# Patient Record
Sex: FEMALE | Race: BLACK OR AFRICAN AMERICAN | Employment: OTHER | URBAN - METROPOLITAN AREA
[De-identification: names, ages, dates, MRNs, and addresses within clinical notes are randomized per-mention and may not be internally consistent; named-entity substitution may affect disease eponyms.]

---

## 2017-02-08 ENCOUNTER — OFFICE VISIT (OUTPATIENT)
Dept: INTERNAL MEDICINE CLINIC | Age: 62
End: 2017-02-08

## 2017-02-08 VITALS
TEMPERATURE: 98.2 F | WEIGHT: 211 LBS | RESPIRATION RATE: 16 BRPM | BODY MASS INDEX: 37.39 KG/M2 | SYSTOLIC BLOOD PRESSURE: 107 MMHG | DIASTOLIC BLOOD PRESSURE: 77 MMHG | HEIGHT: 63 IN | OXYGEN SATURATION: 95 % | HEART RATE: 82 BPM

## 2017-02-08 DIAGNOSIS — E86.0 DEHYDRATION: ICD-10-CM

## 2017-02-08 DIAGNOSIS — R42 DIZZINESS: Primary | ICD-10-CM

## 2017-02-08 DIAGNOSIS — I95.1 ORTHOSTATIC HYPOTENSION: ICD-10-CM

## 2017-02-08 RX ORDER — MECLIZINE HYDROCHLORIDE 25 MG/1
25 TABLET ORAL
Qty: 12 TAB | Refills: 0 | Status: SHIPPED | OUTPATIENT
Start: 2017-02-08 | End: 2017-02-13 | Stop reason: SDUPTHER

## 2017-02-08 NOTE — MR AVS SNAPSHOT
Visit Information Date & Time Provider Department Dept. Phone Encounter #  
 2/8/2017 10:15 AM Reena Perez Ramandeep Rey [phone] [id number] Follow-up Instructions Return if symptoms worsen or fail to improve. Your Appointments 4/25/2017 10:30 AM  
ROUTINE CARE with Sayda Roland MD  
Ramandeep Rey [facility]) Appt Note: htn gerd, gluc [address] [address] [phone]  
  
   
 [address] Upcoming Health Maintenance Date Due Pneumococcal 19-64 Medium Risk (1 of 1 - PPSV23) 10/24/1976 INFLUENZA AGE 9 TO ADULT 8/1/2016 PAP AKA CERVICAL CYTOLOGY 10/30/2016 BREAST CANCER SCRN MAMMOGRAM 11/22/2016 COLONOSCOPY 11/25/2024 DTaP/Tdap/Td series (2 - Td) 1/23/2025 Allergies as of 2/8/2017  Review Complete On: 2/8/2017 By: Reena Perez MD  
  
 Severity Noted Reaction Type Reactions Sulfa (Sulfonamide Antibiotics)  09/09/2010    Other (comments) Bactrim - rash Current Immunizations  Reviewed on 10/17/2016 Name Date Influenza Vaccine 11/7/2014 TD Vaccine 4/22/1999 Tdap 1/23/2015 Not reviewed this visit You Were Diagnosed With   
  
 Codes Comments Dizziness    -  Primary ICD-10-CM: K76 ICD-9-CM: 780.4 Dehydration     ICD-10-CM: E86.0 ICD-9-CM: 276.51 Orthostatic hypotension     ICD-10-CM: I95.1 ICD-9-CM: 458.0 Vitals BP Pulse Temp Resp Height(growth percentile) Weight(growth percentile) 107/77 (BP Patient Position: Standing) 82 98.2 °F (36.8 °C) (Oral) 16 5' 3\" (1.6 m) 211 lb (95.7 kg) SpO2 BMI OB Status Smoking Status 95% 37.38 kg/m2 Postmenopausal Former Smoker Vitals History BMI and BSA Data Body Mass Index Body Surface Area  
 37.38 kg/m 2 2.06 m 2 Preferred Pharmacy Pharmacy Name Phone [facility] [address], [address] [phone] Your Updated Medication List  
  
   
This list is accurate as of: 2/8/17 11:31 AM.  Always use your most recent med list.  
  
  
  
  
 calcium carbonate 200 mg calcium (500 mg) Nai Ramos Commonly known as:  TUMS Take 1 Tab by mouth daily. lisinopril-hydroCHLOROthiazide 10-12.5 mg per tablet Commonly known as:  PRINZIDE, ZESTORETIC  
take 1 tablet by mouth once daily  
  
 meclizine 25 mg tablet Commonly known as:  ANTIVERT Take 1 Tab by mouth three (3) times daily as needed for up to 10 days. For dizziness. raNITIdine 300 mg tablet Commonly known as:  ZANTAC Take 1 Tab by mouth daily. Prescriptions Printed Refills  
 meclizine (ANTIVERT) 25 mg tablet 0 Sig: Take 1 Tab by mouth three (3) times daily as needed for up to 10 days. For dizziness. Class: Print Route: Oral  
  
Follow-up Instructions Return if symptoms worsen or fail to improve. Patient Instructions Dizziness: Care Instructions Your Care Instructions Dizziness is the feeling of unsteadiness or fuzziness in your head. It is different than having vertigo, which is a feeling that the room is spinning or that you are moving or falling. It is also different from lightheadedness, which is the feeling that you are about to faint. It can be hard to know what causes dizziness. Some people feel dizzy when they have migraine headaches. Sometimes bouts of flu can make you feel dizzy. Some medical conditions, such as heart problems or high blood pressure, can make you feel dizzy. Many medicines can cause dizziness, including medicines for high blood pressure, pain, or anxiety. If a medicine causes your symptoms, your doctor may recommend that you stop or change the medicine. If it is a problem with your heart, you may need medicine to help your heart work better.  If there is no clear reason for your symptoms, your doctor may suggest watching and waiting for a while to see if the dizziness goes away on its own. Follow-up care is a key part of your treatment and safety. Be sure to make and go to all appointments, and call your doctor if you are having problems. It's also a good idea to know your test results and keep a list of the medicines you take. How can you care for yourself at home? · If your doctor recommends or prescribes medicine, take it exactly as directed. Call your doctor if you think you are having a problem with your medicine. · Do not drive while you feel dizzy. · Try to prevent falls. Steps you can take include: ¨ Using nonskid mats, adding grab bars near the tub, and using night-lights. ¨ Clearing your home so that walkways are free of anything you might trip on. ¨ Letting family and friends know that you have been feeling dizzy. This will help them know how to help you. When should you call for help? Call 911 anytime you think you may need emergency care. For example, call if: 
· You passed out (lost consciousness). · You have dizziness along with symptoms of a heart attack. These may include: ¨ Chest pain or pressure, or a strange feeling in the chest. 
¨ Sweating. ¨ Shortness of breath. ¨ Nausea or vomiting. ¨ Pain, pressure, or a strange feeling in the back, neck, jaw, or upper belly or in one or both shoulders or arms. ¨ Lightheadedness or sudden weakness. ¨ A fast or irregular heartbeat. · You have symptoms of a stroke. These may include: 
¨ Sudden numbness, tingling, weakness, or loss of movement in your face, arm, or leg, especially on only one side of your body. ¨ Sudden vision changes. ¨ Sudden trouble speaking. ¨ Sudden confusion or trouble understanding simple statements. ¨ Sudden problems with walking or balance. ¨ A sudden, severe headache that is different from past headaches. Call your doctor now or seek immediate medical care if: · You feel dizzy and have a fever, headache, or ringing in your ears. · You have new or increased nausea and vomiting. · Your dizziness does not go away or comes back. Watch closely for changes in your health, and be sure to contact your doctor if: 
· You do not get better as expected. Where can you learn more? Go to http://gomez-denise.info/. Enter J359 in the search box to learn more about \"Dizziness: Care Instructions. \" Current as of: May 27, 2016 Content Version: 11.1 © 3750-3897 [employer]. Care instructions adapted under license by [employer] (which disclaims liability or warranty for this information). If you have questions about a medical condition or this instruction, always ask your healthcare professional. [address] any warranty or liability for your use of this information. Introducing [facility]! Larisa Johnson introduces Syncurity patient portal. Now you can access parts of your medical record, email your doctor's office, and request medication refills online. 1. In your internet browser, go to [email or web address] 2. Click on the First Time User? Click Here link in the Sign In box. You will see the New Member Sign Up page. 3. Enter your Syncurity Access Code exactly as it appears below. You will not need to use this code after youve completed the sign-up process. If you do not sign up before the expiration date, you must request a new code. · Syncurity Access Code: [email or web address] Expires: 5/9/2017 11:31 AM 
 
4. Enter the last four digits of your Social Security Number (xxxx) and Date of Birth (mm/dd/yyyy) as indicated and click Submit. You will be taken to the next sign-up page. 5. Create a HaveMyShiftt ID. This will be your Syncurity login ID and cannot be changed, so think of one that is secure and easy to remember. 6. Create a Syncurity password. You can change your password at any time. 7. Enter your Password Reset Question and Answer. This can be used at a later time if you forget your password. 8. Enter your e-mail address. You will receive e-mail notification when new information is available in [address] 9. Click Sign Up. You can now view and download portions of your medical record. 10. Click the Download Summary menu link to download a portable copy of your medical information. If you have questions, please visit the Frequently Asked Questions section of the Experts 911 website. Remember, Experts 911 is NOT to be used for urgent needs. For medical emergencies, dial 911. Now available from your iPhone and Android! Please provide this summary of care documentation to your next provider. Your primary care clinician is listed as Billy Tobar. If you have any questions after today's visit, please call [phone].

## 2017-02-08 NOTE — PROGRESS NOTES
Reviewed record  In preparation for visit and have obtained necessary documentation. 1. Have you been to the ER, urgent care clinic since your last visit? Hospitalized since your last visit?no  2. Have you seen or consulted any other health care providers outside of the [address] [place] [address] since your last visit? Include any pap smears or colon screening. No  Patient has been given information on advanced directives at a previous visit.    Orthostatics:  Lyin/76 P 74  Sittin/72 P72  Standin/77 P82

## 2017-02-08 NOTE — PROGRESS NOTES
CC:  Chief Complaint   Patient presents with    Dizziness    Nausea     HISTORY OF PRESENT ILLNESS  Makenna Malone is a 63 y.o. female. She complains of experiencing sudden dizziness and nausea this morning while working as a  for disabled adults. Felt like she was going to throw up. She drove back to the main office and was told to leave work and see a doctor. Her sister picked her up and drove her home. Severe dizziness lasted about 30 minutes. She presently still has some lightheadedness but is better than before. Admits that she had not been drinking much water. Drank tea this morning with breakfast. Denies fevers, chills, loss of consciousness, falls, visual changes, ear pains, chest pain, heart palpations, or dyspnea. Denies previous episodes of dizziness. Review of medications: took loratadine 10 mg tablet this morning for allergies; does not normally take it. Took both loratadine and lisinopril-HCTZ at 5:20 am before going to work. Patient Active Problem List   Diagnosis Code    Abnormal Pap smear [id number]    Asthma, mild intermittent J45.20    Hypertension, essential, benign I10    Obesity E66.9    GERD (gastroesophageal reflux disease) K21.9    Hyperlipidemia, mixed E78.2    Glucose intolerance (impaired glucose tolerance) R73.02    Smoker within last 12 months Z87.891     Past Medical History   Diagnosis Date    Alopecia areata     Asthmatic bronchitis     Cyst, breast      breast cyst on L     Hypercholesterolemia     Hypertension      Allergies   Allergen Reactions    Sulfa (Sulfonamide Antibiotics) Other (comments)     Bactrim - rash     Current Outpatient Prescriptions   Medication Sig Dispense Refill    ranitidine (ZANTAC) 300 mg tablet Take 1 Tab by mouth daily.  30 Tab 11    lisinopril-hydrochlorothiazide (PRINZIDE, ZESTORETIC) 10-12.5 mg per tablet take 1 tablet by mouth once daily 90 Tab 3    calcium carbonate (TUMS) 200 mg calcium (500 mg) chew Take 1 Tab by mouth daily. PHYSICAL EXAM  Visit Vitals    /77 (BP Patient Position: Standing)    Pulse 82    Temp 98.2 °F (36.8 °C) (Oral)    Resp 16    Ht 5' 3\" (1.6 m)    Wt 211 lb (95.7 kg)    SpO2 95%    BMI 37.38 kg/m2     Orthostatic Vitals: Lyin/76 P 74, Sittin/72 P 72, Standin/77 P 82    General: Obese, no distress. HEENT:  Head normocephalic/atraumatic, no scleral icterus or conjunctival injection. TM's and ear canals normal bilaterally. Neck: Supple. No lymphadenopathy, thyromegaly, or carotid bruits. Lungs:  Clear to ausculation bilaterally. Good air movement. Heart:  Regular rate and rhythm, normal S1 and S2, no murmur, gallop, or rub  Extremities: No clubbing, cyanosis, or edema. Neurological: Alert and oriented. No nystagmus. Normal strength throughout. Positive Ernesto-Hallpike maneuver. Psychiatric: Normal mood and affect. Behavior is normal.           ASSESSMENT AND PLAN    ICD-10-CM ICD-9-CM    1. Dizziness R42 780.4 meclizine (ANTIVERT) 25 mg tablet   2. Dehydration E86.0 276.51    3. Orthostatic hypotension I95.1 458.0        [facility] KARLY was seen today for dizziness and nausea. Diagnoses and all orders for this visit:    Dizziness  Most likely secondary to orthostatic hypotension from dehydration and combination of antihistamine with blood pressure medication. It is also possible that she has newly presenting vertigo. She does not feel safe to drive at this time. -     Advised to increase fluid intake  -     Given prescription to fill only if dizziness recurred: meclizine (ANTIVERT) 25 mg tablet; Take 1 Tab by mouth three (3) times daily as needed for up to 10 days. For dizziness. -     Work excuse note written with return to work on Fri., 2/10/17. Dehydration    Orthostatic hypotension      Follow-up Disposition:  Return if symptoms worsen or fail to improve.     Provided patient and/or family with advanced directive information and answered pertinent questions. Encouraged patient to provide a copy of advanced directive to the office when available. I have discussed the diagnosis with the patient and the intended plan as seen in the above orders. Patient is in agreement. The patient has received an after-visit summary and questions were answered concerning future plans. I have discussed medication side effects and warnings with the patient as well.

## 2017-02-08 NOTE — PATIENT INSTRUCTIONS
Dizziness: Care Instructions  Your Care Instructions  Dizziness is the feeling of unsteadiness or fuzziness in your head. It is different than having vertigo, which is a feeling that the room is spinning or that you are moving or falling. It is also different from lightheadedness, which is the feeling that you are about to faint. It can be hard to know what causes dizziness. Some people feel dizzy when they have migraine headaches. Sometimes bouts of flu can make you feel dizzy. Some medical conditions, such as heart problems or high blood pressure, can make you feel dizzy. Many medicines can cause dizziness, including medicines for high blood pressure, pain, or anxiety. If a medicine causes your symptoms, your doctor may recommend that you stop or change the medicine. If it is a problem with your heart, you may need medicine to help your heart work better. If there is no clear reason for your symptoms, your doctor may suggest watching and waiting for a while to see if the dizziness goes away on its own. Follow-up care is a key part of your treatment and safety. Be sure to make and go to all appointments, and call your doctor if you are having problems. It's also a good idea to know your test results and keep a list of the medicines you take. How can you care for yourself at home? · If your doctor recommends or prescribes medicine, take it exactly as directed. Call your doctor if you think you are having a problem with your medicine. · Do not drive while you feel dizzy. · Try to prevent falls. Steps you can take include:  ¨ Using nonskid mats, adding grab bars near the tub, and using night-lights. ¨ Clearing your home so that walkways are free of anything you might trip on. ¨ Letting family and friends know that you have been feeling dizzy. This will help them know how to help you. When should you call for help? Call 911 anytime you think you may need emergency care.  For example, call if:  · You passed out (lost consciousness). · You have dizziness along with symptoms of a heart attack. These may include:  ¨ Chest pain or pressure, or a strange feeling in the chest.  ¨ Sweating. ¨ Shortness of breath. ¨ Nausea or vomiting. ¨ Pain, pressure, or a strange feeling in the back, neck, jaw, or upper belly or in one or both shoulders or arms. ¨ Lightheadedness or sudden weakness. ¨ A fast or irregular heartbeat. · You have symptoms of a stroke. These may include:  ¨ Sudden numbness, tingling, weakness, or loss of movement in your face, arm, or leg, especially on only one side of your body. ¨ Sudden vision changes. ¨ Sudden trouble speaking. ¨ Sudden confusion or trouble understanding simple statements. ¨ Sudden problems with walking or balance. ¨ A sudden, severe headache that is different from past headaches. Call your doctor now or seek immediate medical care if:  · You feel dizzy and have a fever, headache, or ringing in your ears. · You have new or increased nausea and vomiting. · Your dizziness does not go away or comes back. Watch closely for changes in your health, and be sure to contact your doctor if:  · You do not get better as expected. Where can you learn more? Go to http://gomez-denise.info/. Enter W255 in the search box to learn more about \"Dizziness: Care Instructions. \"  Current as of: May 27, 2016  Content Version: 11.1  © 3330-5867 [employer]. Care instructions adapted under license by [employer] (which disclaims liability or warranty for this information). If you have questions about a medical condition or this instruction, always ask your healthcare professional. [place] [address] any warranty or liability for your use of this information.

## 2017-02-13 DIAGNOSIS — R42 DIZZINESS: ICD-10-CM

## 2017-02-13 RX ORDER — MECLIZINE HYDROCHLORIDE 25 MG/1
25 TABLET ORAL
Qty: 12 TAB | Refills: 0 | Status: SHIPPED | OUTPATIENT
Start: 2017-02-13 | End: 2017-02-23

## 2017-02-13 NOTE — TELEPHONE ENCOUNTER
Pt saw Dr Sana Pastor last week and was given the following medication to help with dizziness. Pt would like to get a few more to help because she is still having some spells.

## 2017-03-09 ENCOUNTER — HOSPITAL ENCOUNTER (OUTPATIENT)
Dept: CT IMAGING | Age: 62
Discharge: HOME | End: 2017-03-09
Attending: SPECIALIST
Payer: COMMERCIAL

## 2017-03-09 DIAGNOSIS — D32.9 MENINGIOMA (HCC): ICD-10-CM

## 2017-03-09 PROCEDURE — 70450 CT HEAD/BRAIN W/O DYE: CPT

## 2017-05-26 RX ORDER — LISINOPRIL AND HYDROCHLOROTHIAZIDE 10; 12.5 MG/1; MG/1
TABLET ORAL
Qty: 90 TAB | Refills: 3 | Status: SHIPPED | OUTPATIENT
Start: 2017-05-26 | End: 2018-06-08 | Stop reason: SDUPTHER

## 2017-05-26 NOTE — TELEPHONE ENCOUNTER
Pt went to pharmacy yesterday to request her Lisinopril and they advised would send over request because she has no refills, went back today and they advised her haven't heard anything back  Pt would like to know if able to have Rx called in because she is completely out and needs her medication ASAP  Would like to receive call at [phone] once it has been called in

## 2017-06-30 ENCOUNTER — OFFICE VISIT (OUTPATIENT)
Dept: INTERNAL MEDICINE CLINIC | Age: 62
End: 2017-06-30

## 2017-06-30 VITALS
WEIGHT: 215.5 LBS | RESPIRATION RATE: 14 BRPM | TEMPERATURE: 97 F | OXYGEN SATURATION: 96 % | SYSTOLIC BLOOD PRESSURE: 127 MMHG | DIASTOLIC BLOOD PRESSURE: 75 MMHG | HEART RATE: 90 BPM | HEIGHT: 63 IN | BODY MASS INDEX: 38.18 KG/M2

## 2017-06-30 DIAGNOSIS — Z13.31 SCREENING FOR DEPRESSION: ICD-10-CM

## 2017-06-30 DIAGNOSIS — R30.0 BURNING WITH URINATION: ICD-10-CM

## 2017-06-30 DIAGNOSIS — N30.90 CYSTITIS: Primary | ICD-10-CM

## 2017-06-30 LAB
BILIRUB UR QL STRIP: NEGATIVE
GLUCOSE UR-MCNC: NEGATIVE MG/DL
KETONES P FAST UR STRIP-MCNC: NEGATIVE MG/DL
PH UR STRIP: 6 [PH] (ref 4.6–8)
PROT UR QL STRIP: NEGATIVE MG/DL
SP GR UR STRIP: 1.01 (ref 1–1.03)
UA UROBILINOGEN AMB POC: NORMAL (ref 0.2–1)
URINALYSIS CLARITY POC: CLEAR
URINALYSIS COLOR POC: YELLOW
URINE BLOOD POC: NORMAL
URINE LEUKOCYTES POC: NORMAL
URINE NITRITES POC: NEGATIVE

## 2017-06-30 RX ORDER — NITROFURANTOIN 25; 75 MG/1; MG/1
100 CAPSULE ORAL 2 TIMES DAILY
Qty: 10 CAP | Refills: 0 | Status: SHIPPED | OUTPATIENT
Start: 2017-06-30 | End: 2017-08-15 | Stop reason: ALTCHOICE

## 2017-06-30 NOTE — PATIENT INSTRUCTIONS
Urinary Tract Infection in Women: Care Instructions  Your Care Instructions    A urinary tract infection, or UTI, is a general term for an infection anywhere between the kidneys and the urethra (where urine comes out). Most UTIs are bladder infections. They often cause pain or burning when you urinate. UTIs are caused by bacteria and can be cured with antibiotics. Be sure to complete your treatment so that the infection goes away. Follow-up care is a key part of your treatment and safety. Be sure to make and go to all appointments, and call your doctor if you are having problems. It's also a good idea to know your test results and keep a list of the medicines you take. How can you care for yourself at home? · Take your antibiotics as directed. Do not stop taking them just because you feel better. You need to take the full course of antibiotics. · Drink extra water and other fluids for the next day or two. This may help wash out the bacteria that are causing the infection. (If you have kidney, heart, or liver disease and have to limit fluids, talk with your doctor before you increase your fluid intake.)  · Avoid drinks that are carbonated or have caffeine. They can irritate the bladder. · Urinate often. Try to empty your bladder each time. · To relieve pain, take a hot bath or lay a heating pad set on low over your lower belly or genital area. Never go to sleep with a heating pad in place. To prevent UTIs  · Drink plenty of water each day. This helps you urinate often, which clears bacteria from your system. (If you have kidney, heart, or liver disease and have to limit fluids, talk with your doctor before you increase your fluid intake.)  · Urinate when you need to. · Urinate right after you have sex. · Change sanitary pads often. · Avoid douches, bubble baths, feminine hygiene sprays, and other feminine hygiene products that have deodorants.   · After going to the bathroom, wipe from front to back.  When should you call for help? Call your doctor now or seek immediate medical care if:  · Symptoms such as fever, chills, nausea, or vomiting get worse or appear for the first time. · You have new pain in your back just below your rib cage. This is called flank pain. · There is new blood or pus in your urine. · You have any problems with your antibiotic medicine. Watch closely for changes in your health, and be sure to contact your doctor if:  · You are not getting better after taking an antibiotic for 2 days. · Your symptoms go away but then come back. Where can you learn more? Go to http://gomez-denise.info/. Enter G701 in the search box to learn more about \"Urinary Tract Infection in Women: Care Instructions. \"  Current as of: November 28, 2016  Content Version: 11.3  © 0390-0150 [employer]. Care instructions adapted under license by [employer] (which disclaims liability or warranty for this information). If you have questions about a medical condition or this instruction, always ask your healthcare professional. [address] any warranty or liability for your use of this information.

## 2017-06-30 NOTE — MR AVS SNAPSHOT
Visit Information Date & Time Provider Department Dept. Phone Encounter #  
 6/30/2017  1:00 PM MD Aida Nair [phone] [id number] Follow-up Instructions Return if symptoms worsen or fail to improve. Your Appointments 8/15/2017 10:30 AM  
ROUTINE CARE with MD Aida Nair [facility]) Appt Note: htn gerd, gluc; r/s f/up gluc [address] [place] [id number] [phone]  
  
   
 [address] Upcoming Health Maintenance Date Due Pneumococcal 19-64 Medium Risk (1 of 1 - PPSV23) 10/24/1976 PAP AKA CERVICAL CYTOLOGY 10/30/2016 BREAST CANCER SCRN MAMMOGRAM 11/22/2016 INFLUENZA AGE 9 TO ADULT 8/1/2017 COLONOSCOPY 11/25/2024 DTaP/Tdap/Td series (2 - Td) 1/23/2025 Allergies as of 6/30/2017  Review Complete On: 6/30/2017 By: Bronson Parisi MD  
  
 Severity Noted Reaction Type Reactions Penicillins  06/30/2017    Itching Sulfa (Sulfonamide Antibiotics)  09/09/2010    Other (comments) Bactrim - rash Current Immunizations  Reviewed on 6/30/2017 Name Date Influenza Vaccine 11/7/2014 TD Vaccine 4/22/1999 Tdap 1/23/2015 Reviewed by Yessi Pablo LPN on 0[birth year]72/1958 at 78:29 PM  
You Were Diagnosed With   
  
 Codes Comments Cystitis    -  Primary ICD-10-CM: N30.90 ICD-9-CM: 595.9 Burning with urination     ICD-10-CM: R30.0 ICD-9-CM: 788.1 Screening for depression     ICD-10-CM: Z13.89 ICD-9-CM: V79.0 Vitals BP Pulse Temp Resp Height(growth percentile) Weight(growth percentile) 127/75 (BP 1 Location: Left arm, BP Patient Position: Sitting) 90 97 °F (36.1 °C) (Oral) 14 5' 3\" (1.6 m) 215 lb 8 oz (97.8 kg) SpO2 BMI OB Status Smoking Status 96% 38.17 kg/m2 Postmenopausal Former Smoker BMI and BSA Data Body Mass Index Body Surface Area 38.17 kg/m 2 2.08 m 2 Preferred Pharmacy Pharmacy Name Phone [phone] , [address] [phone] Your Updated Medication List  
  
   
This list is accurate as of: 6/30/17  1:24 PM.  Always use your most recent med list.  
  
  
  
  
 calcium carbonate 200 mg calcium (500 mg) Meredith Warren Commonly known as:  TUMS Take 1 Tab by mouth daily. lisinopril-hydroCHLOROthiazide 10-12.5 mg per tablet Commonly known as:  PRINZIDE, ZESTORETIC  
take 1 tablet by mouth once daily  
  
 nitrofurantoin (macrocrystal-monohydrate) 100 mg capsule Commonly known as:  MACROBID Take 1 Cap by mouth two (2) times a day. raNITIdine 300 mg tablet Commonly known as:  ZANTAC Take 1 Tab by mouth daily. Prescriptions Sent to Pharmacy Refills  
 nitrofurantoin, macrocrystal-monohydrate, (MACROBID) 100 mg capsule 0 Sig: Take 1 Cap by mouth two (2) times a day. Class: Normal  
 Pharmacy: [address] Ph #: [phone] Route: Oral  
  
We Performed the Following AMB POC URINALYSIS DIP STICK AUTO W/O MICRO [56129 CPT(R)] BEHAV ASSMT W/SCORE & DOCD/STAND INSTRUMENT [id number] CPT(R)] CULTURE, URINE [id number] CPT(R)] Follow-up Instructions Return if symptoms worsen or fail to improve. Patient Instructions Urinary Tract Infection in Women: Care Instructions Your Care Instructions A urinary tract infection, or UTI, is a general term for an infection anywhere between the kidneys and the urethra (where urine comes out). Most UTIs are bladder infections. They often cause pain or burning when you urinate. UTIs are caused by bacteria and can be cured with antibiotics. Be sure to complete your treatment so that the infection goes away. Follow-up care is a key part of your treatment and safety.  Be sure to make and go to all appointments, and call your doctor if you are having problems. It's also a good idea to know your test results and keep a list of the medicines you take. How can you care for yourself at home? · Take your antibiotics as directed. Do not stop taking them just because you feel better. You need to take the full course of antibiotics. · Drink extra water and other fluids for the next day or two. This may help wash out the bacteria that are causing the infection. (If you have kidney, heart, or liver disease and have to limit fluids, talk with your doctor before you increase your fluid intake.) · Avoid drinks that are carbonated or have caffeine. They can irritate the bladder. · Urinate often. Try to empty your bladder each time. · To relieve pain, take a hot bath or lay a heating pad set on low over your lower belly or genital area. Never go to sleep with a heating pad in place. To prevent UTIs · Drink plenty of water each day. This helps you urinate often, which clears bacteria from your system. (If you have kidney, heart, or liver disease and have to limit fluids, talk with your doctor before you increase your fluid intake.) · Urinate when you need to. · Urinate right after you have sex. · Change sanitary pads often. · Avoid douches, bubble baths, feminine hygiene sprays, and other feminine hygiene products that have deodorants. · After going to the bathroom, wipe from front to back. When should you call for help? Call your doctor now or seek immediate medical care if: · Symptoms such as fever, chills, nausea, or vomiting get worse or appear for the first time. · You have new pain in your back just below your rib cage. This is called flank pain. · There is new blood or pus in your urine. · You have any problems with your antibiotic medicine. Watch closely for changes in your health, and be sure to contact your doctor if: · You are not getting better after taking an antibiotic for 2 days. · Your symptoms go away but then come back. Where can you learn more? Go to http://gomez-denise.info/. Enter U427 in the search box to learn more about \"Urinary Tract Infection in Women: Care Instructions. \" Current as of: November 28, 2016 Content Version: 11.3 © 5341-4290 [employer]. Care instructions adapted under license by [employer] (which disclaims liability or warranty for this information). If you have questions about a medical condition or this instruction, always ask your healthcare professional. [place] [address] any warranty or liability for your use of this information. Introducing [facility]! Tani Myers introduces Posto7 patient portal. Now you can access parts of your medical record, email your doctor's office, and request medication refills online. 1. In your internet browser, go to [email or web address] 2. Click on the First Time User? Click Here link in the Sign In box. You will see the New Member Sign Up page. 3. Enter your Posto7 Access Code exactly as it appears below. You will not need to use this code after youve completed the sign-up process. If you do not sign up before the expiration date, you must request a new code. · Posto7 Access Code: [email or web address] Expires: 9/28/2017  1:24 PM 
 
4. Enter the last four digits of your Social Security Number (xxxx) and Date of Birth (mm/dd/yyyy) as indicated and click Submit. You will be taken to the next sign-up page. 5. Create a Posto7 ID. This will be your Posto7 login ID and cannot be changed, so think of one that is secure and easy to remember. 6. Create a Posto7 password. You can change your password at any time. 7. Enter your Password Reset Question and Answer. This can be used at a later time if you forget your password. 8. Enter your e-mail address. You will receive e-mail notification when new information is available in [address] 9. Click Sign Up. You can now view and download portions of your medical record. 10. Click the Download Summary menu link to download a portable copy of your medical information. If you have questions, please visit the Frequently Asked Questions section of the Life[employer] website. Remember, Life360 is NOT to be used for urgent needs. For medical emergencies, dial 911. Now available from your iPhone and Android! Please provide this summary of care documentation to your next provider. Your primary care clinician is listed as Blake Hunt. If you have any questions after today's visit, please call [phone].

## 2017-06-30 NOTE — PROGRESS NOTES
Reviewed record In preparation for visit and have obtained necessary documentation. Has info on advanced directive but has not filled them out. 1. Have you been to the ER, urgent care clinic or hospitalized since your last visit? No     2. Have you seen or consulted any other health care providers outside of the [address] [place] [address] since your last visit? Include any pap smears or colon screening. CT    Vitals reviewed with provider.     Health Maintenance reviewed:

## 2017-06-30 NOTE — PROGRESS NOTES
HISTORY OF PRESENT ILLNESS  Yobany Foreman is a 63 y.o. female. HPI  She presents with 1 week(s) of dysuria, frequency, urgency, suprapubic pressure. She denies nausea, vomiting, pain in abdomen, fevers and chills. Treatment thus far has been increasing fluids, which has helped with dysuria. Luis Howard     PHQ over the last two weeks 6/30/2017   Little interest or pleasure in doing things Not at all   Feeling down, depressed or hopeless Not at all   Total Score PHQ 2 0     Patient Active Problem List   Diagnosis Code    Abnormal Pap smear [id number]    Asthma, mild intermittent J45.20    Hypertension, essential, benign I10    Obesity E66.9    GERD (gastroesophageal reflux disease) K21.9    Hyperlipidemia, mixed E78.2    Glucose intolerance (impaired glucose tolerance) R73.02    Smoker within last 12 months Z87.891     Past Medical History:   Diagnosis Date    Alopecia areata     Asthmatic bronchitis     Cyst, breast     breast cyst on L     Hypercholesterolemia     Hypertension      Past Surgical History:   Procedure Laterality Date    HX GYN      BTL     Social History     Social History    Marital status:      Spouse name: N/A    Number of children: N/A    Years of education: N/A     Social History Main Topics    Smoking status: Former Smoker     Packs/day: 0.50     Years: 25.00     Quit date: 9/21/2015    Smokeless tobacco: Never Used    Alcohol use 0.0 oz/week     0 Standard drinks or equivalent per week      Comment: occasional    Drug use: None    Sexual activity: Yes     Partners: Male     Other Topics Concern    None     Social History Narrative     Family History   Problem Relation Age of Onset    Heart Disease Mother     Diabetes Mother     Cancer Mother      breast    Stroke Mother     Hypertension Father     Diabetes Father     Dementia Father     Hypertension Sister     Hypertension Sister     Hypertension Brother     Elevated Lipids Brother     Elevated Lipids Brother  Diabetes Maternal Aunt      breast     Allergies   Allergen Reactions    Sulfa (Sulfonamide Antibiotics) Other (comments)     Bactrim - rash     Current Outpatient Prescriptions   Medication Sig Dispense Refill    lisinopril-hydroCHLOROthiazide (PRINZIDE, ZESTORETIC) 10-12.5 mg per tablet take 1 tablet by mouth once daily 90 Tab 3    ranitidine (ZANTAC) 300 mg tablet Take 1 Tab by mouth daily. 30 Tab 11    calcium carbonate (TUMS) 200 mg calcium (500 mg) chew Take 1 Tab by mouth daily. ROS       Visit Vitals    /75 (BP 1 Location: Left arm, BP Patient Position: Sitting)    Pulse 90    Temp 97 °F (36.1 °C) (Oral)    Resp 14    Ht 5' 3\" (1.6 m)    Wt 215 lb 8 oz (97.8 kg)    SpO2 96%    BMI 38.17 kg/m2     Physical Exam   Constitutional: She is oriented to person, place, and time. She appears well-developed and well-nourished. HENT:   Head: Normocephalic and atraumatic. Eyes: Pupils are equal, round, and reactive to light. Neck: Neck supple. Cardiovascular: Normal rate, regular rhythm and normal heart sounds. Exam reveals no gallop. No murmur heard. Pulmonary/Chest: Effort normal and breath sounds normal. She has no wheezes. She has no rales. Abdominal: Soft. Normal appearance and bowel sounds are normal. She exhibits no abdominal bruit and no mass. There is no hepatosplenomegaly. There is tenderness in the suprapubic area. There is no CVA tenderness. Musculoskeletal: She exhibits no edema. Neurological: She is alert and oriented to person, place, and time. Skin: Skin is warm and dry. No erythema. Nursing note and vitals reviewed.     Results for orders placed or performed in visit on 06/30/17   AMB POC URINALYSIS DIP STICK AUTO W/O MICRO   Result Value Ref Range    Color (UA POC) Yellow     Clarity (UA POC) Clear     Glucose (UA POC) Negative Negative    Bilirubin (UA POC) Negative Negative    Ketones (UA POC) Negative Negative    Specific gravity (UA POC) 1.015 1.001 - 1.035    Blood (UA POC) Trace Negative    pH (UA POC) 6 4.6 - 8.0    Protein (UA POC) Negative Negative mg/dL    Urobilinogen (UA POC) 1 mg/dL 0.2 - 1    Nitrites (UA POC) Negative Negative    Leukocyte esterase (UA POC) 1+ Negative       ASSESSMENT and PLAN    ICD-10-CM ICD-9-CM    1. Cystitis N30.90 595.9 nitrofurantoin, macrocrystal-monohydrate, (MACROBID) 100 mg capsule      CULTURE, URINE   2. Burning with urination R30.0 788.1 AMB POC URINALYSIS DIP STICK AUTO W/O MICRO   3. Screening for depression Z13.89 V79.0 BEHAV ASSMT W/SCORE & DOCD/STAND INSTRUMENT       Cystitis  -     nitrofurantoin, macrocrystal-monohydrate, (MACROBID) 100 mg capsule; Take 1 Cap by mouth two (2) times a day. -     CULTURE, URINE    Burning with urination  -     AMB POC URINALYSIS DIP STICK AUTO W/O MICRO    Screening for depression  -     BEHAV ASSMT W/SCORE & DOCD/STAND INSTRUMENT      Follow-up Disposition:  Return if symptoms worsen or fail to improve.  lab results and schedule of future lab studies reviewed with patient  I have discussed the diagnosis with the patient and the intended plan as seen in the above orders. Patient is in agreement. The patient has received an after-visit summary and questions were answered concerning future plans. I have discussed medication side effects and warnings with the patient as well.

## 2017-07-02 LAB — BACTERIA UR CULT: ABNORMAL

## 2017-07-03 NOTE — PROGRESS NOTES
Inform patient bacteria in urine is sensitive to the prescribed antibiotic. She should complete therapy.

## 2017-08-15 ENCOUNTER — OFFICE VISIT (OUTPATIENT)
Dept: INTERNAL MEDICINE CLINIC | Age: 62
End: 2017-08-15

## 2017-08-15 VITALS
HEART RATE: 66 BPM | RESPIRATION RATE: 14 BRPM | SYSTOLIC BLOOD PRESSURE: 127 MMHG | OXYGEN SATURATION: 97 % | BODY MASS INDEX: 38.36 KG/M2 | DIASTOLIC BLOOD PRESSURE: 74 MMHG | HEIGHT: 63 IN | TEMPERATURE: 96.6 F | WEIGHT: 216.5 LBS

## 2017-08-15 DIAGNOSIS — I10 HYPERTENSION, ESSENTIAL, BENIGN: ICD-10-CM

## 2017-08-15 DIAGNOSIS — K21.9 GASTROESOPHAGEAL REFLUX DISEASE WITHOUT ESOPHAGITIS: ICD-10-CM

## 2017-08-15 DIAGNOSIS — R73.02 GLUCOSE INTOLERANCE (IMPAIRED GLUCOSE TOLERANCE): Primary | ICD-10-CM

## 2017-08-15 DIAGNOSIS — Z12.39 BREAST CANCER SCREENING: ICD-10-CM

## 2017-08-15 DIAGNOSIS — E55.9 VITAMIN D DEFICIENCY: ICD-10-CM

## 2017-08-15 DIAGNOSIS — E78.2 HYPERLIPIDEMIA, MIXED: ICD-10-CM

## 2017-08-15 LAB — HBA1C MFR BLD HPLC: 5.7 %

## 2017-08-15 RX ORDER — GLUCOSAMINE SULFATE 1500 MG
POWDER IN PACKET (EA) ORAL DAILY
COMMUNITY
End: 2018-06-22 | Stop reason: SDUPTHER

## 2017-08-15 NOTE — PROGRESS NOTES
HISTORY OF PRESENT ILLNESS  Tejal Membreno is a 63 y.o. female. HPI  She presents for follow up of hypertension, glucose intolerance, obesity and hyperlipidemia. Diet and Lifestyle: generally follows a low fat low cholesterol diet, generally follows a low sodium diet, does not rigorously follow a diabetic diet, sedentary, nonsmoker  Medication compliance: compliant all of the time  Medication side effects: none  Home BP Monitoring:  is well controlled at home, ranging 120's/70's  Cardiovascular ROS:  She denies palpitations, orthopnea, exertional chest pressure/discomfort, claudication, lower extremity edema, dyspnea on exertion, dizziness     She presents for follow up of gastroesophageal reflux. She is currently treating this with ranitidine. Current symptoms include heartburn when eats tomato containing foods, fatty foods. She denies dysphagia, has not lost weight.      Patient Active Problem List   Diagnosis Code    Abnormal Pap smear [id number]    Asthma, mild intermittent J45.20    Hypertension, essential, benign I10    Obesity E66.9    GERD (gastroesophageal reflux disease) K21.9    Hyperlipidemia, mixed E78.2    Glucose intolerance (impaired glucose tolerance) R73.02    Smoker within last 12 months Z87.891     Past Medical History:   Diagnosis Date    Alopecia areata     Asthmatic bronchitis     Cyst, breast     breast cyst on L     Hypercholesterolemia     Hypertension      Past Surgical History:   Procedure Laterality Date    HX GYN      BTL     Social History     Social History    Marital status:      Spouse name: N/A    Number of children: N/A    Years of education: N/A     Social History Main Topics    Smoking status: Former Smoker     Packs/day: 0.50     Years: 25.00     Quit date: 9/21/2015    Smokeless tobacco: Never Used    Alcohol use 0.0 oz/week     0 Standard drinks or equivalent per week      Comment: occasional    Drug use: None    Sexual activity: Yes Partners: Male     Other Topics Concern    None     Social History Narrative     Family History   Problem Relation Age of Onset    Heart Disease Mother     Diabetes Mother     Cancer Mother      breast    Stroke Mother     Hypertension Father     Diabetes Father     Dementia Father     Hypertension Sister     Hypertension Sister     Hypertension Brother     Elevated Lipids Brother     Elevated Lipids Brother     Diabetes Maternal Aunt      breast     Allergies   Allergen Reactions    Penicillins Itching    Sulfa (Sulfonamide Antibiotics) Other (comments)     Bactrim - rash     Current Outpatient Prescriptions   Medication Sig Dispense Refill    lisinopril-hydroCHLOROthiazide (PRINZIDE, ZESTORETIC) 10-12.5 mg per tablet take 1 tablet by mouth once daily 90 Tab 3    ranitidine (ZANTAC) 300 mg tablet Take 1 Tab by mouth daily. 30 Tab 11    calcium carbonate (TUMS) 200 mg calcium (500 mg) chew Take 1 Tab by mouth daily. Review of Systems   Constitutional: Negative for malaise/fatigue and weight loss. Gastrointestinal: Negative for constipation, diarrhea and heartburn. Musculoskeletal: Positive for back pain (low back, to left hip). Negative for joint pain. Neurological: Negative for dizziness, tingling and focal weakness. Visit Vitals    /74 (BP 1 Location: Left arm, BP Patient Position: Sitting)    Pulse 66    Temp 96.6 °F (35.9 °C) (Oral)    Resp 14    Ht 5' 3\" (1.6 m)    Wt 216 lb 8 oz (98.2 kg)    SpO2 97%    BMI 38.35 kg/m2     Physical Exam   Constitutional: She is oriented to person, place, and time. She appears well-developed and well-nourished. HENT:   Head: Normocephalic and atraumatic. Eyes: Conjunctivae are normal. Pupils are equal, round, and reactive to light. Neck: Neck supple. Carotid bruit is not present. No thyromegaly present. Cardiovascular: Normal rate, regular rhythm and normal heart sounds. PMI is not displaced.   Exam reveals no gallop. No murmur heard. Pulses:       Dorsalis pedis pulses are 2+ on the right side, and 2+ on the left side. Posterior tibial pulses are 2+ on the right side, and 2+ on the left side. Pulmonary/Chest: Effort normal. She has no wheezes. She has no rhonchi. She has no rales. Abdominal: Soft. Normal appearance. She exhibits no abdominal bruit and no mass. There is no hepatosplenomegaly. There is no tenderness. Musculoskeletal: She exhibits no edema. Lymphadenopathy:     She has no cervical adenopathy. Right: No supraclavicular adenopathy present. Left: No supraclavicular adenopathy present. Neurological: She is alert and oriented to person, place, and time. No sensory deficit. Skin: Skin is warm, dry and intact. No rash noted. Psychiatric: She has a normal mood and affect. Her behavior is normal.   Nursing note and vitals reviewed. Results for orders placed or performed in visit on 08/15/17   AMB POC HEMOGLOBIN A1C   Result Value Ref Range    Hemoglobin A1c (POC) 5.7 %     Lab Results   Component Value Date/Time    Cholesterol, total 192 10/11/2016 10:07 AM    HDL Cholesterol 41 10/11/2016 10:07 AM    LDL, calculated 123 10/11/2016 10:07 AM    VLDL, calculated 28 10/11/2016 10:07 AM    Triglyceride 141 10/11/2016 10:07 AM     Lab Results   Component Value Date/Time    Sodium 141 10/11/2016 10:07 AM    Potassium 4.4 10/11/2016 10:07 AM    Chloride 101 10/11/2016 10:07 AM    CO2 26 10/11/2016 10:07 AM    Glucose 86 10/11/2016 10:07 AM    BUN 12 10/11/2016 10:07 AM    Creatinine 0.62 10/11/2016 10:07 AM    BUN/Creatinine ratio 19 10/11/2016 10:07 AM    GFR est  10/11/2016 10:07 AM    GFR est non- 10/11/2016 10:07 AM    Calcium 9.3 10/11/2016 10:07 AM    Bilirubin, total 0.4 10/11/2016 10:07 AM    AST (SGOT) 15 10/11/2016 10:07 AM    Alk.  phosphatase 87 10/11/2016 10:07 AM    Protein, total 7.2 10/11/2016 10:07 AM    Albumin 4.3 10/11/2016 10:07 AM    A-G Ratio 1.5 10/11/2016 10:07 AM    ALT (SGPT) 17 10/11/2016 10:07 AM       ASSESSMENT and PLAN    ICD-10-CM ICD-9-CM    1. Glucose intolerance (impaired glucose tolerance) R73.02 790.22 AMB POC HEMOGLOBIN A1C      HEMOGLOBIN A1C WITH EAG   2. Hypertension, essential, benign I10 401.1    3. Hyperlipidemia, mixed E78.2 272.2 LIPID PANEL      METABOLIC PANEL, COMPREHENSIVE   4. Gastroesophageal reflux disease without esophagitis K21.9 530.81    5. Obesity, Class II, BMI 35-39.9, with comorbidity E66.9 278.00    6. Vitamin D deficiency E55.9 268.9 VITAMIN D, 25 HYDROXY   7. Breast cancer screening Z12.39 V76.10 [facility] MAMMO BI SCREENING INCL CAD     Diagnoses and all orders for this visit:    1. Glucose intolerance (impaired glucose tolerance)  -     AMB POC HEMOGLOBIN A1C  -     HEMOGLOBIN A1C WITH EAG; Future    2. Hypertension, essential, benign  Hypertension is controlled    3. Hyperlipidemia, mixed  Hyperlipidemia is borderline controlled. -     LIPID PANEL; Future  -     METABOLIC PANEL, COMPREHENSIVE; Future    4. Gastroesophageal reflux disease without esophagitis  Discussed symptom control     5. Obesity, Class II, BMI 35-39.9, with comorbidity    6. Vitamin D deficiency  -     VITAMIN D, 25 HYDROXY; Future    7. Breast cancer screening  -     [facility] MAMMO BI SCREENING INCL CAD; Future      Follow-up Disposition:  Return in about 6 months (around 2/15/2018) for gluc, htn, chol  Fasting lab one week prior . lab results and schedule of future lab studies reviewed with patient  reviewed diet, exercise and weight control  cardiovascular risk and specific lipid/LDL goals reviewed  Discussed the patient's BMI with her. The BMI follow up plan is as follows: I have counseled this patient on diet and exercise regimens  I have discussed the diagnosis with the patient and the intended plan as seen in the above orders. Patient is in agreement.   The patient has received an after-visit summary and questions were answered concerning future plans.  I have discussed medication side effects and warnings with the patient as well.

## 2017-08-15 NOTE — MR AVS SNAPSHOT
Visit Information Date & Time Provider Department Dept. Phone Encounter #  
 8/15/2017 10:30 AM Nalini Manning MD Zack Salazar [phone] [id number] Follow-up Instructions Return in about 6 months (around 2/15/2018) for gluc, htn, chol  Fasting lab one week prior . Upcoming Health Maintenance Date Due Pneumococcal 19-64 Medium Risk (1 of 1 - PPSV23) 10/24/1976 PAP AKA CERVICAL CYTOLOGY 10/30/2016 BREAST CANCER SCRN MAMMOGRAM 11/22/2016 INFLUENZA AGE 9 TO ADULT 8/1/2017 COLONOSCOPY 11/25/2024 DTaP/Tdap/Td series (2 - Td) 1/23/2025 Allergies as of 8/15/2017  Review Complete On: 8/15/2017 By: Nalini Manning MD  
  
 Severity Noted Reaction Type Reactions Penicillins  06/30/2017    Itching Sulfa (Sulfonamide Antibiotics)  09/09/2010    Other (comments) Bactrim - rash Current Immunizations  Reviewed on 8/15/2017 Name Date Influenza Vaccine 11/7/2014 TD Vaccine 4/22/1999 Tdap 1/23/2015 Reviewed by Ashley Corona LPN on 3/18/4647 at 23:57 AM  
You Were Diagnosed With   
  
 Codes Comments Glucose intolerance (impaired glucose tolerance)    -  Primary ICD-10-CM: R73.02 
ICD-9-CM: 790.22 Hypertension, essential, benign     ICD-10-CM: I10 
ICD-9-CM: 401.1 Hyperlipidemia, mixed     ICD-10-CM: E78.2 ICD-9-CM: 272.2 Gastroesophageal reflux disease without esophagitis     ICD-10-CM: K21.9 ICD-9-CM: 530.81 Breast cancer screening     ICD-10-CM: Z12.39 
ICD-9-CM: V76.10 Vitamin D deficiency     ICD-10-CM: E55.9 ICD-9-CM: 268.9 Vitals BP Pulse Temp Resp Height(growth percentile) Weight(growth percentile) 127/74 (BP 1 Location: Left arm, BP Patient Position: Sitting) 66 96.6 °F (35.9 °C) (Oral) 14 5' 3\" (1.6 m) 216 lb 8 oz (98.2 kg) SpO2 BMI OB Status Smoking Status 97% 38.35 kg/m2 Postmenopausal Former Smoker BMI and BSA Data Body Mass Index Body Surface Area  
 38.35 kg/m 2 2.09 m 2 Preferred Pharmacy Pharmacy Name Phone [phone] [phone] Your Updated Medication List  
  
   
This list is accurate as of: 8/15/17 11:49 AM.  Always use your most recent med list.  
  
  
  
  
 calcium carbonate 200 mg calcium (500 mg) Segundo Attica Commonly known as:  TUMS Take 1 Tab by mouth daily. lisinopril-hydroCHLOROthiazide 10-12.5 mg per tablet Commonly known as:  PRINZIDE, ZESTORETIC  
take 1 tablet by mouth once daily  
  
 raNITIdine 300 mg tablet Commonly known as:  ZANTAC Take 1 Tab by mouth daily. VITAMIN D3 1,000 unit Cap Generic drug:  cholecalciferol Take  by mouth daily. We Performed the Following AMB POC HEMOGLOBIN A1C [57324 CPT(R)] Follow-up Instructions Return in about 6 months (around 2/15/2018) for gluc, htn, chol  Fasting lab one week prior . To-Do List   
 Around 09/13/2017 Imaging:  [facility] BI SCREENING INCL CAD   
  
 02/15/2018 Lab:  HEMOGLOBIN A1C WITH EAG   
  
 02/15/2018 Lab:  LIPID PANEL   
  
 02/15/2018 Lab:  METABOLIC PANEL, COMPREHENSIVE   
  
 02/15/2018 Lab:  VITAMIN D, 25 HYDROXY Patient Instructions Office visit in 6 months with fasting lab work a week before visit. Gastroesophageal Reflux Disease (GERD): Care Instructions Your Care Instructions Gastroesophageal reflux disease (GERD) is the backward flow of stomach acid into the esophagus. The esophagus is the tube that leads from your throat to your stomach. A one-way valve prevents the stomach acid from moving up into this tube. When you have GERD, this valve does not close tightly enough. If you have mild GERD symptoms including heartburn, you may be able to control the problem with antacids or over-the-counter medicine.  Changing your diet, losing weight, and making other lifestyle changes can also help reduce symptoms. Follow-up care is a key part of your treatment and safety. Be sure to make and go to all appointments, and call your doctor if you are having problems. Its also a good idea to know your test results and keep a list of the medicines you take. How can you care for yourself at home? · Take your medicines exactly as prescribed. Call your doctor if you think you are having a problem with your medicine. · Your doctor may recommend over-the-counter medicine. For mild or occasional indigestion, antacids, such as Tums, Gaviscon, Mylanta, or Maalox, may help. Your doctor also may recommend over-the-counter acid reducers, such as Pepcid AC, Tagamet HB, Zantac 75, or Prilosec. Read and follow all instructions on the label. If you use these medicines often, talk with your doctor. · Change your eating habits. ¨ Its best to eat several small meals instead of two or three large meals. ¨ After you eat, wait 2 to 3 hours before you lie down. ¨ Chocolate, mint, and alcohol can make GERD worse. ¨ Spicy foods, foods that have a lot of acid (like tomatoes and oranges), and coffee can make GERD symptoms worse in some people. If your symptoms are worse after you eat a certain food, you may want to stop eating that food to see if your symptoms get better. · Do not smoke or chew tobacco. Smoking can make GERD worse. If you need help quitting, talk to your doctor about stop-smoking programs and medicines. These can increase your chances of quitting for good. · If you have GERD symptoms at night, raise the head of your bed 6 to 8 inches by putting the frame on blocks or placing a foam wedge under the head of your mattress. (Adding extra pillows does not work.) · Do not wear tight clothing around your middle. · Lose weight if you need to. Losing just 5 to 10 pounds can help. When should you call for help? Call your doctor now or seek immediate medical care if: 
· You have new or different belly pain. · Your stools are black and tarlike or have streaks of blood. Watch closely for changes in your health, and be sure to contact your doctor if: 
· Your symptoms have not improved after 2 days. · Food seems to catch in your throat or chest. 
Where can you learn more? Go to http://gomez-denise.info/. Enter D319 in the search box to learn more about \"Gastroesophageal Reflux Disease (GERD): Care Instructions. \" Current as of: August 9, 2016 Content Version: 11.3 © 4901-5503 [employer]. Care instructions adapted under license by [employer] (which disclaims liability or warranty for this information). If you have questions about a medical condition or this instruction, always ask your healthcare professional. [address] any warranty or liability for your use of this information. Introducing [facility]! [facility] introduces [employer] patient portal. Now you can access parts of your medical record, email your doctor's office, and request medication refills online. 1. In your internet browser, go to [email or web address] 2. Click on the First Time User? Click Here link in the Sign In box. You will see the New Member Sign Up page. 3. Enter your [employer] Access Code exactly as it appears below. You will not need to use this code after youve completed the sign-up process. If you do not sign up before the expiration date, you must request a new code. · [employer] Access Code: [email or web address] Expires: 9/28/2017  1:24 PM 
 
4. Enter the last four digits of your Social Security Number (xxxx) and Date of Birth (mm/dd/yyyy) as indicated and click Submit. You will be taken to the next sign-up page. 5. Create a [employer] ID.  This will be your [employer] login ID and cannot be changed, so think of one that is secure and easy to remember. 6. Create a T[employer] password. You can change your password at any time. 7. Enter your Password Reset Question and Answer. This can be used at a later time if you forget your password. 8. Enter your e-mail address. You will receive e-mail notification when new information is available in [address] 9. Click Sign Up. You can now view and download portions of your medical record. 10. Click the Download Summary menu link to download a portable copy of your medical information. If you have questions, please visit the Frequently Asked Questions section of the [employer] website. Remember, [employer] is NOT to be used for urgent needs. For medical emergencies, dial 911. Now available from your iPhone and Android! Please provide this summary of care documentation to your next provider. Your primary care clinician is listed as Kaylene Blancas. If you have any questions after today's visit, please call [phone].

## 2017-08-15 NOTE — PATIENT INSTRUCTIONS
Office visit in 6 months with fasting lab work a week before visit. Gastroesophageal Reflux Disease (GERD): Care Instructions  Your Care Instructions    Gastroesophageal reflux disease (GERD) is the backward flow of stomach acid into the esophagus. The esophagus is the tube that leads from your throat to your stomach. A one-way valve prevents the stomach acid from moving up into this tube. When you have GERD, this valve does not close tightly enough. If you have mild GERD symptoms including heartburn, you may be able to control the problem with antacids or over-the-counter medicine. Changing your diet, losing weight, and making other lifestyle changes can also help reduce symptoms. Follow-up care is a key part of your treatment and safety. Be sure to make and go to all appointments, and call your doctor if you are having problems. Its also a good idea to know your test results and keep a list of the medicines you take. How can you care for yourself at home? · Take your medicines exactly as prescribed. Call your doctor if you think you are having a problem with your medicine. · Your doctor may recommend over-the-counter medicine. For mild or occasional indigestion, antacids, such as Tums, Gaviscon, Mylanta, or Maalox, may help. Your doctor also may recommend over-the-counter acid reducers, such as Pepcid AC, Tagamet HB, Zantac 75, or Prilosec. Read and follow all instructions on the label. If you use these medicines often, talk with your doctor. · Change your eating habits. ¨ Its best to eat several small meals instead of two or three large meals. ¨ After you eat, wait 2 to 3 hours before you lie down. ¨ Chocolate, mint, and alcohol can make GERD worse. ¨ Spicy foods, foods that have a lot of acid (like tomatoes and oranges), and coffee can make GERD symptoms worse in some people.  If your symptoms are worse after you eat a certain food, you may want to stop eating that food to see if your symptoms get better. · Do not smoke or chew tobacco. Smoking can make GERD worse. If you need help quitting, talk to your doctor about stop-smoking programs and medicines. These can increase your chances of quitting for good. · If you have GERD symptoms at night, raise the head of your bed 6 to 8 inches by putting the frame on blocks or placing a foam wedge under the head of your mattress. (Adding extra pillows does not work.)  · Do not wear tight clothing around your middle. · Lose weight if you need to. Losing just 5 to 10 pounds can help. When should you call for help? Call your doctor now or seek immediate medical care if:  · You have new or different belly pain. · Your stools are black and tarlike or have streaks of blood. Watch closely for changes in your health, and be sure to contact your doctor if:  · Your symptoms have not improved after 2 days. · Food seems to catch in your throat or chest.  Where can you learn more? Go to http://gomez-denise.info/. Enter A364 in the search box to learn more about \"Gastroesophageal Reflux Disease (GERD): Care Instructions. \"  Current as of: August 9, 2016  Content Version: 11.3  © 9614-9012 [employer]. Care instructions adapted under license by [employer] (which disclaims liability or warranty for this information). If you have questions about a medical condition or this instruction, always ask your healthcare professional. [place] [address] any warranty or liability for your use of this information.

## 2017-08-15 NOTE — PROGRESS NOTES
Reviewed record In preparation for visit and have obtained necessary documentation. Has info on advanced directive but has not filled them out. 1. Have you been to the ER, urgent care clinic or hospitalized since your last visit? No     2. Have you seen or consulted any other health care providers outside of the [facility] since your last visit? Include any pap smears or colon screening. No    Vitals reviewed with provider.     Health Maintenance reviewed:

## 2017-08-30 ENCOUNTER — HOSPITAL ENCOUNTER (OUTPATIENT)
Dept: MAMMOGRAPHY | Age: 62
Discharge: HOME | End: 2017-08-30
Attending: INTERNAL MEDICINE
Payer: COMMERCIAL

## 2017-08-30 DIAGNOSIS — Z12.39 BREAST CANCER SCREENING: ICD-10-CM

## 2017-08-30 PROCEDURE — 77067 SCR MAMMO BI INCL CAD: CPT

## 2017-10-03 ENCOUNTER — HOSPITAL ENCOUNTER (EMERGENCY)
Age: 62
Discharge: HOME | End: 2017-10-03
Attending: EMERGENCY MEDICINE
Payer: OTHER MISCELLANEOUS

## 2017-10-03 ENCOUNTER — APPOINTMENT (OUTPATIENT)
Dept: GENERAL RADIOLOGY | Age: 62
End: 2017-10-03
Attending: PHYSICIAN ASSISTANT
Payer: OTHER MISCELLANEOUS

## 2017-10-03 ENCOUNTER — APPOINTMENT (OUTPATIENT)
Dept: GENERAL RADIOLOGY | Age: 62
End: 2017-10-03
Payer: OTHER MISCELLANEOUS

## 2017-10-03 VITALS
DIASTOLIC BLOOD PRESSURE: 81 MMHG | HEIGHT: 64 IN | TEMPERATURE: 97.9 F | RESPIRATION RATE: 16 BRPM | HEART RATE: 80 BPM | OXYGEN SATURATION: 99 % | SYSTOLIC BLOOD PRESSURE: 120 MMHG | BODY MASS INDEX: 36.28 KG/M2 | WEIGHT: 212.52 LBS

## 2017-10-03 DIAGNOSIS — N30.00 ACUTE CYSTITIS WITHOUT HEMATURIA: ICD-10-CM

## 2017-10-03 DIAGNOSIS — M54.42 ACUTE BILATERAL LOW BACK PAIN WITH LEFT-SIDED SCIATICA: Primary | ICD-10-CM
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
## 2017-10-03 DIAGNOSIS — S66.912A WRIST STRAIN, LEFT, INITIAL ENCOUNTER: ICD-10-CM

## 2017-10-03 LAB
APPEARANCE UR: ABNORMAL
BACTERIA URNS QL MICRO: ABNORMAL /HPF
BILIRUB UR QL: NEGATIVE
COLOR UR: ABNORMAL
EPITH CASTS URNS QL MICRO: ABNORMAL /LPF
GLUCOSE UR STRIP.AUTO-MCNC: NEGATIVE MG/DL
HGB UR QL STRIP: NEGATIVE
KETONES UR QL STRIP.AUTO: NEGATIVE MG/DL
LEUKOCYTE ESTERASE UR QL STRIP.AUTO: ABNORMAL
NITRITE UR QL STRIP.AUTO: NEGATIVE
OTHER,OTHU: ABNORMAL
PH UR STRIP: 6 [PH] (ref 5–8)
PROT UR STRIP-MCNC: NEGATIVE MG/DL
RBC #/AREA URNS HPF: ABNORMAL /HPF (ref 0–5)
SP GR UR REFRACTOMETRY: 1.02 (ref 1–1.03)
UA: UC IF INDICATED,UAUC: ABNORMAL
UROBILINOGEN UR QL STRIP.AUTO: 1 EU/DL (ref 0.2–1)
WBC URNS QL MICRO: ABNORMAL /HPF (ref 0–4)

## 2017-10-03 PROCEDURE — 74011250637 HC RX REV CODE- 250/637: Performed by: PHYSICIAN ASSISTANT

## 2017-10-03 PROCEDURE — 81001 URINALYSIS AUTO W/SCOPE: CPT | Performed by: PHYSICIAN ASSISTANT

## 2017-10-03 PROCEDURE — 73110 X-RAY EXAM OF WRIST: CPT

## 2017-10-03 PROCEDURE — 72100 X-RAY EXAM L-S SPINE 2/3 VWS: CPT

## 2017-10-03 PROCEDURE — 87086 URINE CULTURE/COLONY COUNT: CPT | Performed by: PHYSICIAN ASSISTANT

## 2017-10-03 PROCEDURE — 99283 EMERGENCY DEPT VISIT LOW MDM: CPT

## 2017-10-03 PROCEDURE — L3809 WHFO W/O JOINTS PRE OTS: HCPCS

## 2017-10-03 RX ORDER — DIAZEPAM 5 MG/1
5 TABLET ORAL
Qty: 15 TAB | Refills: 0 | Status: SHIPPED | OUTPATIENT
Start: 2017-10-03 | End: 2018-01-22

## 2017-10-03 RX ORDER — CEPHALEXIN 500 MG/1
500 CAPSULE ORAL 3 TIMES DAILY
Qty: 21 CAP | Refills: 0 | Status: SHIPPED | OUTPATIENT
Start: 2017-10-03 | End: 2017-10-10

## 2017-10-03 RX ORDER — IBUPROFEN 600 MG/1
600 TABLET ORAL
Status: COMPLETED | OUTPATIENT
Start: 2017-10-03 | End: 2017-10-03

## 2017-10-03 RX ORDER — IBUPROFEN 600 MG/1
600 TABLET ORAL
Qty: 20 TAB | Refills: 0 | Status: SHIPPED | OUTPATIENT
Start: 2017-10-03 | End: 2018-01-22

## 2017-10-03 RX ORDER — LIDOCAINE 50 MG/G
1 PATCH TOPICAL EVERY 24 HOURS
Status: DISCONTINUED | OUTPATIENT
Start: 2017-10-03 | End: 2017-10-04 | Stop reason: HOSPADM

## 2017-10-03 RX ADMIN — IBUPROFEN 600 MG: 600 TABLET, FILM COATED ORAL at 21:06

## 2017-10-04 NOTE — DISCHARGE INSTRUCTIONS
Learning About Relief for Back Pain  What is back tension and strain? Back strain happens when you overstretch, or pull, a muscle in your back. You may hurt your back in an accident or when you exercise or lift something. Most back pain will get better with rest and time. You can take care of yourself at home to help your back heal.  What can you do first to relieve back pain? When you first feel back pain, try these steps:  · Walk. Take a short walk (10 to 20 minutes) on a level surface (no slopes, hills, or stairs) every 2 to 3 hours. Walk only distances you can manage without pain, especially leg pain. · Relax. Find a comfortable position for rest. Some people are comfortable on the floor or a medium-firm bed with a small pillow under their head and another under their knees. Some people prefer to lie on their side with a pillow between their knees. Don't stay in one position for too long. · Try heat or ice. Try using a heating pad on a low or medium setting, or take a warm shower, for 15 to 20 minutes every 2 to 3 hours. Or you can buy single-use heat wraps that last up to 8 hours. You can also try an ice pack for 10 to 15 minutes every 2 to 3 hours. You can use an ice pack or a bag of frozen vegetables wrapped in a thin towel. There is not strong evidence that either heat or ice will help, but you can try them to see if they help. You may also want to try switching between heat and cold. · Take pain medicine exactly as directed. ¨ If the doctor gave you a prescription medicine for pain, take it as prescribed. ¨ If you are not taking a prescription pain medicine, ask your doctor if you can take an over-the-counter medicine. What else can you do? · Stretch and exercise. Exercises that increase flexibility may relieve your pain and make it easier for your muscles to keep your spine in a good, neutral position. And don't forget to keep walking. · Do self-massage.  You can use self-massage to unwind after work or school or to energize yourself in the morning. You can easily massage your feet, hands, or neck. Self-massage works best if you are in comfortable clothes and are sitting or lying in a comfortable position. Use oil or lotion to massage bare skin. · Reduce stress. Back pain can lead to a vicious [place]: Distress about the pain tenses the muscles in your back, which in turn causes more pain. Learn how to relax your mind and your muscles to lower your stress. Where can you learn more? Go to http://gomez-denise.info/. Enter L749 in the search box to learn more about \"Learning About Relief for Back Pain. \"  Current as of: March 21, 2017  Content Version: 11.3  © 1018-3073 [employer]. Care instructions adapted under license by [employer] (which disclaims liability or warranty for this information). If you have questions about a medical condition or this instruction, always ask your healthcare professional. [place] [address] any warranty or liability for your use of this information.
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
## 2017-10-05 LAB
BACTERIA SPEC CULT: NORMAL
CC UR VC: NORMAL
SERVICE CMNT-IMP: NORMAL

## 2018-01-22 ENCOUNTER — OFFICE VISIT (OUTPATIENT)
Dept: INTERNAL MEDICINE CLINIC | Age: 63
End: 2018-01-22

## 2018-01-22 VITALS
TEMPERATURE: 98.4 F | RESPIRATION RATE: 16 BRPM | HEIGHT: 64 IN | BODY MASS INDEX: 36.37 KG/M2 | SYSTOLIC BLOOD PRESSURE: 121 MMHG | DIASTOLIC BLOOD PRESSURE: 75 MMHG | HEART RATE: 65 BPM | OXYGEN SATURATION: 95 % | WEIGHT: 213 LBS

## 2018-01-22 DIAGNOSIS — M54.42 CHRONIC MIDLINE LOW BACK PAIN WITH BILATERAL SCIATICA: ICD-10-CM

## 2018-01-22 DIAGNOSIS — Z13.31 SCREENING FOR DEPRESSION: ICD-10-CM

## 2018-01-22 DIAGNOSIS — G89.29 CHRONIC MIDLINE LOW BACK PAIN WITH BILATERAL SCIATICA: ICD-10-CM

## 2018-01-22 DIAGNOSIS — F41.9 ANXIETY DISORDER, UNSPECIFIED TYPE: Primary | ICD-10-CM

## 2018-01-22 DIAGNOSIS — M54.41 CHRONIC MIDLINE LOW BACK PAIN WITH BILATERAL SCIATICA: ICD-10-CM

## 2018-01-22 RX ORDER — PIROXICAM 20 MG/1
CAPSULE ORAL
Refills: 0 | COMMUNITY
Start: 2018-01-09 | End: 2018-06-22

## 2018-01-22 RX ORDER — CYCLOBENZAPRINE HCL 10 MG
TABLET ORAL
Refills: 0 | COMMUNITY
Start: 2018-01-16 | End: 2018-06-22 | Stop reason: SDUPTHER

## 2018-01-22 RX ORDER — DULOXETIN HYDROCHLORIDE 30 MG/1
30 CAPSULE, DELAYED RELEASE ORAL DAILY
Qty: 30 CAP | Refills: 5 | Status: SHIPPED | OUTPATIENT
Start: 2018-01-22 | End: 2018-04-03
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
## 2018-01-22 NOTE — MR AVS SNAPSHOT
Danica Fowler 
 
 
 [address] [place] [address] [phone] Patient: Trena Edmond MRN: [id number] :1955 Visit Information Date & Time Provider Department Dept. Phone Encounter #  
 2018 10:00 AM Shanell Burnette, 40 [facility] [phone] [id number] Follow-up Instructions Return in about 4 weeks (around 2018) for Add anxiety to appt on . Your Appointments 2018 10:30 AM  
LAB with LAB TT 40 [facility] Appt Note: fasting lab  
 [address] [address] [phone]  
  
   
 [facility]  
  
    
 2018 10:30 AM  
ROUTINE CARE with Shanell Burnette MD  
[address] [place] [address]) Appt Note: 6mth f/u; gluc, htn, chol  Fasting lab one week prior [address] [address] [phone]  
  
   
 8 [facility] [address] Upcoming Health Maintenance Date Due  
 PAP AKA CERVICAL CYTOLOGY 10/30/2016 ZOSTER VACCINE AGE 60> 2017 BREAST CANCER SCRN MAMMOGRAM 2019 COLONOSCOPY 2024 DTaP/Tdap/Td series (2 - Td) 2025 Allergies as of 2018  Review Complete On: 2018 By: Shanell Burnette MD  
  
 Severity Noted Reaction Type Reactions Penicillins  2017    Itching Sulfa (Sulfonamide Antibiotics)  2010    Other (comments) Bactrim - rash Current Immunizations  Reviewed on 8/15/2017 Name Date Influenza Vaccine 2014 TD Vaccine 1999 Tdap 2015 Not reviewed this visit You Were Diagnosed With   
  
 Codes Comments Anxiety disorder, unspecified type    -  Primary ICD-10-CM: F41.9 ICD-9-CM: 300.00  Chronic midline low back pain with bilateral sciatica     ICD-10-CM: M54.41, M54.42, G89.29 
 ICD-9-CM: 724.2, 724.3, 338.29 Vitals BP Pulse Temp Resp Height(growth percentile) Weight(growth percentile) 121/75 (BP 1 Location: Left arm, BP Patient Position: Sitting) 65 98.4 °F (36.9 °C) (Oral) 16 5' 4\" (1.626 m) 213 lb (96.6 kg) SpO2 BMI OB Status Smoking Status 95% 36.56 kg/m2 Postmenopausal Former Smoker Vitals History BMI and BSA Data Body Mass Index Body Surface Area  
 36.56 kg/m 2 2.09 m 2 Preferred Pharmacy Pharmacy Name Phone [facility] [address] , [address] [phone] Your Updated Medication List  
  
   
This list is accurate as of: 1/22/18 11:07 AM.  Always use your most recent med list.  
  
  
  
  
 calcium carbonate 200 mg calcium (500 mg) Carlota Mayberry Commonly known as:  TUMS Take 1 Tab by mouth daily. cyclobenzaprine 10 mg tablet Commonly known as:  FLEXERIL  
TAKE 1 TABLET BY MOUTH 3 TIMES A DAY FOR MUSCLE RELAXATION DULoxetine 30 mg capsule Commonly known as:  CYMBALTA Take 1 Cap by mouth daily. lisinopril-hydroCHLOROthiazide 10-12.5 mg per tablet Commonly known as:  PRINZIDE, ZESTORETIC  
take 1 tablet by mouth once daily  
  
 piroxicam 20 mg capsule Commonly known as:  FELDENE  
TAKE 1 CAPSULE BY MOUTH DAILY  
  
 raNITIdine 300 mg tablet Commonly known as:  ZANTAC Take 1 Tab by mouth daily. VITAMIN D3 1,000 unit Cap Generic drug:  cholecalciferol Take  by mouth daily. Prescriptions Sent to Pharmacy Refills DULoxetine (CYMBALTA) 30 mg capsule 5 Sig: Take 1 Cap by mouth daily. Class: Normal  
 Pharmacy: [address] Ph #: [phone] Route: Oral  
  
Follow-up Instructions Return in about 4 weeks (around 2/20/2018) for Add anxiety to appt on 2/20. Patient Instructions Trial of duloxetine 30 mg once a day.  This may help with anxiety and back pain, but it may take several weeks. Keep appointment in one month Look for a licensed profession counselor, Master of Social Work therapist, or psychologist who does cognitive behavioral therapy. This will be helpful for this problem. [facility] at [address], [address] [place], [address] T: [phone] [facility] at [facility], [address] [facility] [address] Tel: [phone] [address] [place], [address] T: 21  [facility] [address] [place], [address] [place] Phone: [phone] [facility], third floor [place], [address] 
[phone] Body Mass Index: Care Instructions Your Care Instructions Body mass index (BMI) can help you see if your weight is raising your risk for health problems. It uses a formula to compare how much you weigh with how tall you are. · A BMI lower than 18.5 is considered underweight. · A BMI between 18.5 and 24.9 is considered healthy. · A BMI between 25 and 29.9 is considered overweight. A BMI of 30 or higher is considered obese. If your BMI is in the normal range, it means that you have a lower risk for weight-related health problems. If your BMI is in the overweight or obese range, you may be at increased risk for weight-related health problems, such as high blood pressure, heart disease, stroke, arthritis or joint pain, and diabetes. If your BMI is in the underweight range, you may be at increased risk for health problems such as fatigue, lower protection (immunity) against illness, muscle loss, bone loss, hair loss, and hormone problems. BMI is just one measure of your risk for weight-related health problems.  You may be at higher risk for health problems if you are not active, you eat an unhealthy diet, or you drink too much alcohol or use tobacco products. Follow-up care is a key part of your treatment and safety. Be sure to make and go to all appointments, and call your doctor if you are having problems. It's also a good idea to know your test results and keep a list of the medicines you take. How can you care for yourself at home? · Practice healthy eating habits. This includes eating plenty of fruits, vegetables, whole grains, lean protein, and low-fat dairy. · If your doctor recommends it, get more exercise. Walking is a good choice. Bit by bit, increase the amount you walk every day. Try for at least 30 minutes on most days of the week. · Do not smoke. Smoking can increase your risk for health problems. If you need help quitting, talk to your doctor about stop-smoking programs and medicines. These can increase your chances of quitting for good. · Limit alcohol to 2 drinks a day for men and 1 drink a day for women. Too much alcohol can cause health problems. If you have a BMI higher than 25 · Your doctor may do other tests to check your risk for weight-related health problems. This may include measuring the distance around your waist. A waist measurement of more than 40 inches in men or 35 inches in women can increase the risk of weight-related health problems. · Talk with your doctor about steps you can take to stay healthy or improve your health. You may need to make lifestyle changes to lose weight and stay healthy, such as changing your diet and getting regular exercise. If you have a BMI lower than 18.5 · Your doctor may do other tests to check your risk for health problems. · Talk with your doctor about steps you can take to stay healthy or improve your health. You may need to make lifestyle changes to gain or maintain weight and stay healthy, such as getting more healthy foods in your diet and doing exercises to build muscle. Where can you learn more? Go to http://gomez-denise.info/. Enter S176 in the search box to learn more about \"Body Mass Index: Care Instructions. \" Current as of: October 13, 2016 Content Version: 11.4 © 1611-5542 [employer]. Care instructions adapted under license by [employer] (which disclaims liability or warranty for this information). If you have questions about a medical condition or this instruction, always ask your healthcare professional. [address] any warranty or liability for your use of this information. Introducing [address]! [facility] introduces Mobile Shareholder patient portal. Now you can access parts of your medical record, email your doctor's office, and request medication refills online. 1. In your internet browser, go to [email or web address] [email or web address] [place] 2. Click on the First Time User? Click Here link in the Sign In box. You will see the New Member Sign Up page. 3. Enter your Mobile Shareholder Access Code exactly as it appears below. You will not need to use this code after youve completed the sign-up process. If you do not sign up before the expiration date, you must request a new code. · Mobile Shareholder Access Code: [email or web address] Expires: 4/22/2018 10:35 AM 
 
4. Enter the last four digits of your Social Security Number (xxxx) and Date of Birth (mm/dd/yyyy) as indicated and click Submit. You will be taken to the next sign-up page. 5. Create a Mobile Shareholder ID. This will be your Mobile Shareholder login ID and cannot be changed, so think of one that is secure and easy to remember. 6. Create a Mobile Shareholder password. You can change your password at any time. 7. Enter your Password Reset Question and Answer. This can be used at a later time if you forget your password. 8. Enter your e-mail address. You will receive e-mail notification when new information is available in [address] 9. Click Sign Up.  You can now view and download portions of your medical record. 10. Click the Download Summary menu link to download a portable copy of your medical information. If you have questions, please visit the Frequently Asked Questions section of the [employer] website. Remember, [employer] is NOT to be used for urgent needs. For medical emergencies, dial 911. Now available from your iPhone and Android! Please provide this summary of care documentation to your next provider. Your primary care clinician is listed as Vj Bonilla. If you have any questions after today's visit, please call [phone].

## 2018-01-22 NOTE — PATIENT INSTRUCTIONS
Trial of duloxetine 30 mg once a day. This may help with anxiety and back pain, but it may take several weeks. Keep appointment in one month    Look for a licensed profession counselor, Master of Social Work therapist, or psychologist who does cognitive behavioral therapy. This will be helpful for this problem. [facility] at [place] [address], [facility] [address], [address] [facility], [address]  T: 620 [facility],[address] at 103 [facility], [address] [place] [address] [address]  [address] Esther A[address] Jamesjesse  [place], [address]  Tel: [phone]   [place], [address]   T: [phone]      [facility]  [address] [place] [address]  [phone]  Phone: [address]  [address] [place] [address], third floor  [facility], [address]  [phone]        Body Mass Index: Care Instructions  Your Care Instructions    Body mass index (BMI) can help you see if your weight is raising your risk for health problems. It uses a formula to compare how much you weigh with how tall you are. · A BMI lower than 18.5 is considered underweight. · A BMI between 18.5 and 24.9 is considered healthy. · A BMI between 25 and 29.9 is considered overweight. A BMI of 30 or higher is considered obese. If your BMI is in the normal range, it means that you have a lower risk for weight-related health problems. If your BMI is in the overweight or obese range, you may be at increased risk for weight-related health problems, such as high blood pressure, heart disease, stroke, arthritis or joint pain, and diabetes. If your BMI is in the underweight range, you may be at increased risk for health problems such as fatigue, lower protection (immunity) against illness, muscle loss, bone loss, hair loss, and hormone problems. BMI is just one measure of your risk for weight-related health problems.  You may be at higher risk for health problems if you are not active, you eat an unhealthy diet, or you drink too much alcohol or use tobacco products. Follow-up care is a key part of your treatment and safety. Be sure to make and go to all appointments, and call your doctor if you are having problems. It's also a good idea to know your test results and keep a list of the medicines you take. How can you care for yourself at home? · Practice healthy eating habits. This includes eating plenty of fruits, vegetables, whole grains, lean protein, and low-fat dairy. · If your doctor recommends it, get more exercise. Walking is a good choice. Bit by bit, increase the amount you walk every day. Try for at least 30 minutes on most days of the week. · Do not smoke. Smoking can increase your risk for health problems. If you need help quitting, talk to your doctor about stop-smoking programs and medicines. These can increase your chances of quitting for good. · Limit alcohol to 2 drinks a day for men and 1 drink a day for women. Too much alcohol can cause health problems. If you have a BMI higher than 25  · Your doctor may do other tests to check your risk for weight-related health problems. This may include measuring the distance around your waist. A waist measurement of more than 40 inches in men or 35 inches in women can increase the risk of weight-related health problems. · Talk with your doctor about steps you can take to stay healthy or improve your health. You may need to make lifestyle changes to lose weight and stay healthy, such as changing your diet and getting regular exercise. If you have a BMI lower than 18.5  · Your doctor may do other tests to check your risk for health problems. · Talk with your doctor about steps you can take to stay healthy or improve your health.  You may need to make lifestyle changes to gain or maintain weight and stay healthy, such as getting more healthy foods in your diet and doing exercises to build muscle. Where can you learn more? Go to http://gomez-denise.info/. Enter S176 in the search box to learn more about \"Body Mass Index: Care Instructions. \"  Current as of: October 13, 2016  Content Version: 11.4  © 6056-7447 [employer]. Care instructions adapted under license by [employer] (which disclaims liability or warranty for this information). If you have questions about a medical condition or this instruction, always ask your healthcare professional. [address] any warranty or liability for your use of this information.

## 2018-03-02 ENCOUNTER — HOSPITAL ENCOUNTER (OUTPATIENT)
Dept: MRI IMAGING | Age: 63
Discharge: HOME | End: 2018-03-02
Attending: EMERGENCY MEDICINE
Payer: OTHER MISCELLANEOUS

## 2018-03-02 DIAGNOSIS — S39.012D LUMBAR SPINE STRAIN, SUBSEQUENT ENCOUNTER: ICD-10-CM

## 2018-03-02 PROCEDURE — 72148 MRI LUMBAR SPINE W/O DYE: CPT

## 2018-03-07 ENCOUNTER — TELEPHONE (OUTPATIENT)
Dept: INTERNAL MEDICINE CLINIC | Age: 63
End: 2018-03-07

## 2018-03-07 NOTE — TELEPHONE ENCOUNTER
Pt needs a doctor's and insurance referral to see an orthopaedic specialist.  She would like to see someone at [facility]. Physician she picked was only for urgent care needs. Reason: Back pain    Date of Appt: Not made yet. Pt can be reached at [phone].

## 2018-03-15 NOTE — TELEPHONE ENCOUNTER
Pt called to advised that the physician she's going to see is    Dr. Medina Fontenot  (P) [phone]  (C) [phone]    For: Back pain     When: 3/16/18 @ 11AM    Would like return call once referral faxed

## 2018-04-03 ENCOUNTER — HOSPITAL ENCOUNTER (EMERGENCY)
Age: 63
Discharge: HOME | End: 2018-04-03
Attending: EMERGENCY MEDICINE
Payer: COMMERCIAL

## 2018-04-03 ENCOUNTER — APPOINTMENT (OUTPATIENT)
Dept: GENERAL RADIOLOGY | Age: 63
End: 2018-04-03
Attending: PHYSICIAN ASSISTANT
Payer: COMMERCIAL

## 2018-04-03 VITALS
TEMPERATURE: 98.5 F | SYSTOLIC BLOOD PRESSURE: 146 MMHG | RESPIRATION RATE: 16 BRPM | HEART RATE: 96 BPM | WEIGHT: 214.73 LBS | OXYGEN SATURATION: 100 % | BODY MASS INDEX: 38.05 KG/M2 | DIASTOLIC BLOOD PRESSURE: 73 MMHG | HEIGHT: 63 IN
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
## 2018-04-03 DIAGNOSIS — S39.012A STRAIN OF LUMBAR REGION, INITIAL ENCOUNTER: Primary | ICD-10-CM

## 2018-04-03 DIAGNOSIS — M25.561 ACUTE PAIN OF RIGHT KNEE: ICD-10-CM

## 2018-04-03 PROCEDURE — 74011250637 HC RX REV CODE- 250/637: Performed by: PHYSICIAN ASSISTANT

## 2018-04-03 PROCEDURE — 73562 X-RAY EXAM OF KNEE 3: CPT

## 2018-04-03 PROCEDURE — 72100 X-RAY EXAM L-S SPINE 2/3 VWS: CPT

## 2018-04-03 PROCEDURE — 99283 EMERGENCY DEPT VISIT LOW MDM: CPT

## 2018-04-03 RX ORDER — CYCLOBENZAPRINE HCL 10 MG
10 TABLET ORAL
Qty: 20 TAB | Refills: 0 | Status: SHIPPED | OUTPATIENT
Start: 2018-04-03 | End: 2018-06-22

## 2018-04-03 RX ORDER — HYDROCODONE BITARTRATE AND ACETAMINOPHEN 5; 325 MG/1; MG/1
1 TABLET ORAL
Qty: 10 TAB | Refills: 0 | Status: SHIPPED | OUTPATIENT
Start: 2018-04-03 | End: 2018-06-22

## 2018-04-03 RX ORDER — IBUPROFEN 600 MG/1
600 TABLET ORAL
Status: COMPLETED | OUTPATIENT
Start: 2018-04-03 | End: 2018-04-03

## 2018-04-03 RX ORDER — IBUPROFEN 800 MG/1
800 TABLET ORAL
Qty: 20 TAB | Refills: 0 | Status: SHIPPED | OUTPATIENT
Start: 2018-04-03 | End: 2018-04-10

## 2018-04-03 RX ADMIN — IBUPROFEN 600 MG: 600 TABLET, FILM COATED ORAL at 09:17

## 2018-04-03 NOTE — LETTER
[address] 
[facility] EMERGENCY DEPT 
[address] [facility] [address] [id number] [phone] Work/School Note Date: 4/3/2018 To Whom It May concern: Karl Holder was seen and treated today in the emergency room by the following provider(s): 
Attending Provider: Drew Fishman MD 
Physician Assistant: HU Ballesteros. Karl Holder may return to work on [id number] or sooner if symptoms improve. Sincerely, Merlin Purvis PA
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
## 2018-04-11 ENCOUNTER — TELEPHONE (OUTPATIENT)
Dept: INTERNAL MEDICINE CLINIC | Facility: CLINIC | Age: 63
End: 2018-04-11

## 2018-04-11 NOTE — TELEPHONE ENCOUNTER
----- Message from Judith Gray sent at 4/11/2018 12:21 PM EDT -----  Regarding: Dr. Genie Saunders from [facility] is requesting a referral for the pt. April's contact [phone].

## 2018-04-12 ENCOUNTER — TELEPHONE (OUTPATIENT)
Dept: INTERNAL MEDICINE CLINIC | Age: 63
End: 2018-04-12

## 2018-04-12 DIAGNOSIS — M54.41 CHRONIC BILATERAL LOW BACK PAIN WITH BILATERAL SCIATICA: Primary | ICD-10-CM

## 2018-04-12 DIAGNOSIS — M54.42 CHRONIC BILATERAL LOW BACK PAIN WITH BILATERAL SCIATICA: Primary | ICD-10-CM

## 2018-04-12 DIAGNOSIS — G89.29 CHRONIC BILATERAL LOW BACK PAIN WITH BILATERAL SCIATICA: Primary | ICD-10-CM

## 2018-04-12 NOTE — TELEPHONE ENCOUNTER
Notified PT that I had left vm for patient to call back, we will need the order to show Dr Orin Rubalcava.

## 2018-04-12 NOTE — TELEPHONE ENCOUNTER
----- Message from Warren Castrejon sent at 4/12/2018  9:19 AM EDT -----  Regarding: [place]/Referral  Shirley Andrade with [facility] is requesting a referral.Pt has an appointment tomorrow at 12:00pm.She stated she has called a few times. [facility] number is [phone] and fax [phone].

## 2018-04-12 NOTE — TELEPHONE ENCOUNTER
Pt was seen by ortho and referred for PT, calling us to get an insurance referral. [facility] and asked them to fax ortho referral for PT over.

## 2018-04-12 NOTE — TELEPHONE ENCOUNTER
----- Message from Abby Little sent at 4/12/2018 11:12 AM EDT -----  Regarding: Dr. Stacie Mendez  The pt is returning a call received on 4/12/18. Best Contact [phone].

## 2018-04-12 NOTE — TELEPHONE ENCOUNTER
PT faxed back stating the did not receive a RX from otho just a phone call from patient. Message left on patient's vm to call office.

## 2018-04-26 ENCOUNTER — APPOINTMENT (OUTPATIENT)
Dept: INTERNAL MEDICINE CLINIC | Facility: CLINIC | Age: 63
End: 2018-04-26

## 2018-04-26 DIAGNOSIS — E55.9 VITAMIN D DEFICIENCY: ICD-10-CM

## 2018-04-26 DIAGNOSIS — R73.02 GLUCOSE INTOLERANCE (IMPAIRED GLUCOSE TOLERANCE): ICD-10-CM

## 2018-04-26 DIAGNOSIS — E78.2 HYPERLIPIDEMIA, MIXED: ICD-10-CM

## 2018-04-27 LAB
25(OH)D3+25(OH)D2 SERPL-MCNC: 24.7 NG/ML (ref 30–100)
ALBUMIN SERPL-MCNC: 3.9 G/DL (ref 3.6–4.8)
ALBUMIN/GLOB SERPL: 1.3 {RATIO} (ref 1.2–2.2)
ALP SERPL-CCNC: 85 IU/L (ref 39–117)
ALT SERPL-CCNC: 13 IU/L (ref 0–32)
AST SERPL-CCNC: 13 IU/L (ref 0–40)
BILIRUB SERPL-MCNC: 0.6 MG/DL (ref 0–1.2)
BUN SERPL-MCNC: 13 MG/DL (ref 8–27)
BUN/CREAT SERPL: 17 (ref 12–28)
CALCIUM SERPL-MCNC: 9.5 MG/DL (ref 8.7–10.3)
CHLORIDE SERPL-SCNC: 101 MMOL/L (ref 96–106)
CHOLEST SERPL-MCNC: 190 MG/DL (ref 100–199)
CO2 SERPL-SCNC: 26 MMOL/L (ref 18–29)
CREAT SERPL-MCNC: 0.75 MG/DL (ref 0.57–1)
EST. AVERAGE GLUCOSE BLD GHB EST-MCNC: 117 MG/DL
GFR SERPLBLD CREATININE-BSD FMLA CKD-EPI: 100 ML/MIN/1.73
GFR SERPLBLD CREATININE-BSD FMLA CKD-EPI: 87 ML/MIN/1.73
GLOBULIN SER CALC-MCNC: 3 G/DL (ref 1.5–4.5)
GLUCOSE SERPL-MCNC: 95 MG/DL (ref 65–99)
HBA1C MFR BLD: 5.7 % (ref 4.8–5.6)
HDLC SERPL-MCNC: 43 MG/DL
LDLC SERPL CALC-MCNC: 125 MG/DL (ref 0–99)
POTASSIUM SERPL-SCNC: 4.5 MMOL/L (ref 3.5–5.2)
PROT SERPL-MCNC: 6.9 G/DL (ref 6–8.5)
SODIUM SERPL-SCNC: 143 MMOL/L (ref 134–144)
TRIGL SERPL-MCNC: 108 MG/DL (ref 0–149)
VLDLC SERPL CALC-MCNC: 22 MG/DL (ref 5–40)

## 2018-06-08 RX ORDER — LISINOPRIL AND HYDROCHLOROTHIAZIDE 10; 12.5 MG/1; MG/1
TABLET ORAL
Qty: 90 TAB | Refills: 3 | Status: SHIPPED | OUTPATIENT
Start: 2018-06-08 | End: 2019-06-05 | Stop reason: SDUPTHER

## 2018-06-22 ENCOUNTER — OFFICE VISIT (OUTPATIENT)
Dept: INTERNAL MEDICINE CLINIC | Facility: CLINIC | Age: 63
End: 2018-06-22

## 2018-06-22 VITALS
TEMPERATURE: 98.2 F | HEART RATE: 71 BPM | WEIGHT: 199 LBS | SYSTOLIC BLOOD PRESSURE: 134 MMHG | OXYGEN SATURATION: 96 % | HEIGHT: 63 IN | BODY MASS INDEX: 35.26 KG/M2 | DIASTOLIC BLOOD PRESSURE: 86 MMHG | RESPIRATION RATE: 16 BRPM

## 2018-06-22 DIAGNOSIS — F41.9 ANXIETY DISORDER, UNSPECIFIED TYPE: ICD-10-CM

## 2018-06-22 DIAGNOSIS — R73.02 GLUCOSE INTOLERANCE (IMPAIRED GLUCOSE TOLERANCE): ICD-10-CM

## 2018-06-22 DIAGNOSIS — M17.11 PRIMARY OSTEOARTHRITIS OF RIGHT KNEE: ICD-10-CM

## 2018-06-22 DIAGNOSIS — K21.9 GASTROESOPHAGEAL REFLUX DISEASE WITHOUT ESOPHAGITIS: ICD-10-CM

## 2018-06-22 DIAGNOSIS — G89.29 CHRONIC BILATERAL LOW BACK PAIN WITHOUT SCIATICA: ICD-10-CM

## 2018-06-22 DIAGNOSIS — E78.2 HYPERLIPIDEMIA, MIXED: ICD-10-CM

## 2018-06-22 DIAGNOSIS — M54.50 CHRONIC BILATERAL LOW BACK PAIN WITHOUT SCIATICA: ICD-10-CM

## 2018-06-22 DIAGNOSIS — I10 HYPERTENSION, ESSENTIAL, BENIGN: Primary | ICD-10-CM

## 2018-06-22 DIAGNOSIS — E66.01 SEVERE OBESITY (BMI 35.0-39.9): ICD-10-CM

## 2018-06-22 RX ORDER — LIDOCAINE 50 MG/G
1 PATCH TOPICAL
COMMUNITY
Start: 2018-05-31 | End: 2019-11-04

## 2018-06-22 RX ORDER — IBUPROFEN 800 MG/1
TABLET ORAL
Refills: 0 | COMMUNITY
Start: 2018-04-18 | End: 2018-06-22 | Stop reason: SDUPTHER

## 2018-06-22 RX ORDER — DULOXETIN HYDROCHLORIDE 30 MG/1
30 CAPSULE, DELAYED RELEASE ORAL DAILY
COMMUNITY
End: 2018-06-22 | Stop reason: SDUPTHER

## 2018-06-22 RX ORDER — RANITIDINE 300 MG/1
300 TABLET ORAL DAILY
Qty: 30 TAB | Refills: 11 | Status: SHIPPED | OUTPATIENT
Start: 2018-06-22 | End: 2019-07-06 | Stop reason: SDUPTHER

## 2018-06-22 RX ORDER — BACLOFEN 10 MG/1
TABLET ORAL 3 TIMES DAILY
COMMUNITY
End: 2018-12-27

## 2018-06-22 RX ORDER — DULOXETINE 40 MG/1
30 CAPSULE, DELAYED RELEASE ORAL DAILY
Qty: 30 CAP | Refills: 11 | Status: SHIPPED | OUTPATIENT
Start: 2018-06-22 | End: 2018-06-23 | Stop reason: SDUPTHER

## 2018-06-22 RX ORDER — DULOXETIN HYDROCHLORIDE 30 MG/1
30 CAPSULE, DELAYED RELEASE ORAL DAILY
Qty: 30 CAP | Refills: 11 | Status: CANCELLED | OUTPATIENT
Start: 2018-06-22

## 2018-06-22 RX ORDER — IBUPROFEN 800 MG/1
800 TABLET ORAL
COMMUNITY
Start: 2018-04-18

## 2018-06-22 NOTE — TELEPHONE ENCOUNTER
Incoming fax from "[employer] needing clarification about duloxetine 40 mg caps but sig states to take 30 mg      Medication Detail         Disp Refills Start End        DULoxetine 40 mg cpDR 30 Cap 11 6/22/2018       Sig - Route:  Take 30 mg by mouth daily. - Oral      E-Prescribing Status: Receipt confirmed by pharmacy (6/22/2018  2:23 PM EDT)        Associated Diagnoses      Anxiety disorder, unspecified type           Pharmacy      [id number], [address]
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
## 2018-06-22 NOTE — PATIENT INSTRUCTIONS
Add back ranitidine and let me know if you have muscle cramps again. Consider statin to lower cholesterol is not improved next time  Increase duloxetine to 40 mg daily for anxiety  Office visit in 6 months with fasting lab work a week before visit. DASH Diet: Care Instructions  Your Care Instructions    The DASH diet is an eating plan that can help lower your blood pressure. DASH stands for Dietary Approaches to Stop Hypertension. Hypertension is high blood pressure. The DASH diet focuses on eating foods that are high in calcium, potassium, and magnesium. These nutrients can lower blood pressure. The foods that are highest in these nutrients are fruits, vegetables, low-fat dairy products, nuts, seeds, and legumes. But taking calcium, potassium, and magnesium supplements instead of eating foods that are high in those nutrients does not have the same effect. The DASH diet also includes whole grains, fish, and poultry. The DASH diet is one of several lifestyle changes your doctor may recommend to lower your high blood pressure. Your doctor may also want you to decrease the amount of sodium in your diet. Lowering sodium while following the DASH diet can lower blood pressure even further than just the DASH diet alone. Follow-up care is a key part of your treatment and safety. Be sure to make and go to all appointments, and call your doctor if you are having problems. It's also a good idea to know your test results and keep a list of the medicines you take. How can you care for yourself at home? Following the DASH diet  · Eat 4 to 5 servings of fruit each day. A serving is 1 medium-sized piece of fruit, ½ cup chopped or canned fruit, 1/4 cup dried fruit, or 4 ounces (½ cup) of fruit juice. Choose fruit more often than fruit juice. · Eat 4 to 5 servings of vegetables each day.  A serving is 1 cup of lettuce or raw leafy vegetables, ½ cup of chopped or cooked vegetables, or 4 ounces (½ cup) of vegetable juice. Choose vegetables more often than vegetable juice. · Get 2 to 3 servings of low-fat and fat-free dairy each day. A serving is 8 ounces of milk, 1 cup of yogurt, or 1 ½ ounces of cheese. · Eat 6 to 8 servings of grains each day. A serving is 1 slice of bread, 1 ounce of dry cereal, or ½ cup of cooked rice, pasta, or cooked cereal. Try to choose whole-grain products as much as possible. · Limit lean meat, poultry, and fish to 2 servings each day. A serving is 3 ounces, about the size of a deck of cards. · Eat 4 to 5 servings of nuts, seeds, and legumes (cooked dried beans, lentils, and split peas) each week. A serving is 1/3 cup of nuts, 2 tablespoons of seeds, or ½ cup of cooked beans or peas. · Limit fats and oils to 2 to 3 servings each day. A serving is 1 teaspoon of vegetable oil or 2 tablespoons of salad dressing. · Limit sweets and added sugars to 5 servings or less a week. A serving is 1 tablespoon jelly or jam, ½ cup sorbet, or 1 cup of lemonade. · Eat less than 2,300 milligrams (mg) of sodium a day. If you limit your sodium to 1,500 mg a day, you can lower your blood pressure even more. Tips for success  · Start small. Do not try to make dramatic changes to your diet all at once. You might feel that you are missing out on your favorite foods and then be more likely to not follow the plan. Make small changes, and stick with them. Once those changes become habit, add a few more changes. · Try some of the following:  ¨ Make it a goal to eat a fruit or vegetable at every meal and at snacks. This will make it easy to get the recommended amount of fruits and vegetables each day. ¨ Try yogurt topped with fruit and nuts for a snack or healthy dessert. ¨ Add lettuce, tomato, cucumber, and onion to sandwiches. ¨ Combine a ready-made pizza crust with low-fat mozzarella cheese and lots of vegetable toppings. Try using tomatoes, squash, spinach, broccoli, carrots, cauliflower, and onions.   ¨ Have a variety of cut-up vegetables with a low-fat dip as an appetizer instead of chips and dip. ¨ Sprinkle sunflower seeds or chopped almonds over salads. Or try adding chopped walnuts or almonds to cooked vegetables. ¨ Try some vegetarian meals using beans and peas. Add garbanzo or kidney beans to salads. Make burritos and tacos with mashed lucas beans or black beans. Where can you learn more? Go to http://gomez-denise.info/. Enter M682 in the search box to learn more about \"DASH Diet: Care Instructions. \"  Current as of: September 21, 2016  Content Version: 11.4  © 2215-1200 [employer]. Care instructions adapted under license by [employer] (which disclaims liability or warranty for this information). If you have questions about a medical condition or this instruction, always ask your healthcare professional. [facility] any warranty or liability for your use of this information.

## 2018-06-22 NOTE — MR AVS SNAPSHOT
[address] [place] [address] [phone] Patient: Shivam Cruz MRN: [id number] :1955 Visit Information Date & Time Provider Department Dept. Phone Encounter #  
 2018  1:15 PM Vivien Che MD [facility] Internal Medicine [address] [id number] Follow-up Instructions Return in about 6 months (around 2018) for HTN, chol, gluc  Fasting lab one week prior. Routing History Upcoming Health Maintenance Date Due  
 PAP AKA CERVICAL CYTOLOGY 10/30/2016 ZOSTER VACCINE AGE 60> 2017 Influenza Age 5 to Adult 2018 BREAST CANCER SCRN MAMMOGRAM 2019 COLONOSCOPY 2024 DTaP/Tdap/Td series (2 - Td) 2025 Allergies as of 2018  Review Complete On: 2018 By: Vivien Che MD  
  
 Severity Noted Reaction Type Reactions Penicillins  2017    Hives Sulfa (Sulfonamide Antibiotics)  2010    Other (comments) Bactrim - rash Current Immunizations  Reviewed on 8/15/2017 Name Date Influenza Vaccine 2014 TD Vaccine 1999 Tdap 2015 Not reviewed this visit You Were Diagnosed With   
  
 Codes Comments Hypertension, essential, benign    -  Primary ICD-10-CM: I10 
ICD-9-CM: 401.1 Hyperlipidemia, mixed     ICD-10-CM: E78.2 ICD-9-CM: 272.2 Glucose intolerance (impaired glucose tolerance)     ICD-10-CM: R73.02 
ICD-9-CM: 790.22 Severe obesity (BMI 35.0-39.9) (HCC)     ICD-10-CM: E66.01 
ICD-9-CM: 278.01 Gastroesophageal reflux disease without esophagitis     ICD-10-CM: K21.9 ICD-9-CM: 530.81 Anxiety disorder, unspecified type     ICD-10-CM: F41.9 ICD-9-CM: 300.00 Vitals BP Pulse Temp Resp Height(growth percentile) Weight(growth percentile) 134/86 (BP 1 Location: Left arm, BP Patient Position: Sitting) 71 98.2 °F (36.8 °C) (Oral) 16 5' 3\" (1.6 m) 199 lb (90.3 kg) SpO2 BMI OB Status Smoking Status 96% 35.25 kg/m2 Postmenopausal Former Smoker Vitals History BMI and BSA Data Body Mass Index Body Surface Area  
 35.25 kg/m 2 2 m 2 Preferred Pharmacy Pharmacy Name Phone [facility] [address] [place], [address] [phone] Your Updated Medication List  
  
   
This list is accurate as of 6/22/18  2:24 PM.  Always use your most recent med list.  
  
  
  
  
 baclofen 10 mg tablet Commonly known as:  LIORESAL Take  by mouth three (3) times daily. calcium carbonate 200 mg calcium (500 mg) Chew Commonly known as:  TUMS Take 1 Tab by mouth daily. DULoxetine 40 mg Cpdr  
Take 30 mg by mouth daily. ibuprofen 800 mg tablet Commonly known as:  MOTRIN Take 800 mg by mouth.  
  
 lidocaine 5 % Commonly known as:  Clarita Mahnaz Apply 1 Patch to affected area. lisinopril-hydroCHLOROthiazide 10-12.5 mg per tablet Commonly known as:  PRINZIDE, ZESTORETIC  
take 1 tablet by mouth once daily  
  
 raNITIdine 300 mg tablet Commonly known as:  ZANTAC Take 1 Tab by mouth daily. Prescriptions Sent to Pharmacy Refills  
 raNITIdine (ZANTAC) 300 mg tablet 11 Sig: Take 1 Tab by mouth daily. Class: Normal  
 Pharmacy: [address] Ph #: [phone] Route: Oral  
 DULoxetine 40 mg cpDR 11 Sig: Take 30 mg by mouth daily. Class: Normal  
 Pharmacy: [address] Ph #: [phone] Route: Oral  
  
Follow-up Instructions Return in about 6 months (around 12/22/2018) for HTN, chol, gluc  Fasting lab one week prior. To-Do List   
 12/22/2018 Lab:  HEMOGLOBIN A1C WITH EAG   
  
 12/22/2018 Lab:  LIPID PANEL Patient Instructions Add back ranitidine and let me know if you have muscle cramps again. Consider statin to lower cholesterol is not improved next time Increase duloxetine to 40 mg daily for anxiety Office visit in 6 months with fasting lab work a week before visit. DASH Diet: Care Instructions Your Care Instructions The DASH diet is an eating plan that can help lower your blood pressure. DASH stands for Dietary Approaches to Stop Hypertension. Hypertension is high blood pressure. The DASH diet focuses on eating foods that are high in calcium, potassium, and magnesium. These nutrients can lower blood pressure. The foods that are highest in these nutrients are fruits, vegetables, low-fat dairy products, nuts, seeds, and legumes. But taking calcium, potassium, and magnesium supplements instead of eating foods that are high in those nutrients does not have the same effect. The DASH diet also includes whole grains, fish, and poultry. The DASH diet is one of several lifestyle changes your doctor may recommend to lower your high blood pressure. Your doctor may also want you to decrease the amount of sodium in your diet. Lowering sodium while following the DASH diet can lower blood pressure even further than just the DASH diet alone. Follow-up care is a key part of your treatment and safety. Be sure to make and go to all appointments, and call your doctor if you are having problems. It's also a good idea to know your test results and keep a list of the medicines you take. How can you care for yourself at home? Following the DASH diet · Eat 4 to 5 servings of fruit each day. A serving is 1 medium-sized piece of fruit, ½ cup chopped or canned fruit, 1/4 cup dried fruit, or 4 ounces (½ cup) of fruit juice. Choose fruit more often than fruit juice. · Eat 4 to 5 servings of vegetables each day. A serving is 1 cup of lettuce or raw leafy vegetables, ½ cup of chopped or cooked vegetables, or 4 ounces (½ cup) of vegetable juice. Choose vegetables more often than vegetable juice. · Get 2 to 3 servings of low-fat and fat-free dairy each day. A serving is 8 ounces of milk, 1 cup of yogurt, or 1 ½ ounces of cheese. · Eat 6 to 8 servings of grains each day. A serving is 1 slice of bread, 1 ounce of dry cereal, or ½ cup of cooked rice, pasta, or cooked cereal. Try to choose whole-grain products as much as possible. · Limit lean meat, poultry, and fish to 2 servings each day. A serving is 3 ounces, about the size of a deck of cards. · Eat 4 to 5 servings of nuts, seeds, and legumes (cooked dried beans, lentils, and split peas) each week. A serving is 1/3 cup of nuts, 2 tablespoons of seeds, or ½ cup of cooked beans or peas. · Limit fats and oils to 2 to 3 servings each day. A serving is 1 teaspoon of vegetable oil or 2 tablespoons of salad dressing. · Limit sweets and added sugars to 5 servings or less a week. A serving is 1 tablespoon jelly or jam, ½ cup sorbet, or 1 cup of lemonade. · Eat less than 2,300 milligrams (mg) of sodium a day. If you limit your sodium to 1,500 mg a day, you can lower your blood pressure even more. Tips for success · Start small. Do not try to make dramatic changes to your diet all at once. You might feel that you are missing out on your favorite foods and then be more likely to not follow the plan. Make small changes, and stick with them. Once those changes become habit, add a few more changes. · Try some of the following: ¨ Make it a goal to eat a fruit or vegetable at every meal and at snacks. This will make it easy to get the recommended amount of fruits and vegetables each day. ¨ Try yogurt topped with fruit and nuts for a snack or healthy dessert. ¨ Add lettuce, tomato, cucumber, and onion to sandwiches. ¨ Combine a ready-made pizza crust with low-fat mozzarella cheese and lots of vegetable toppings. Try using tomatoes, squash, spinach, broccoli, carrots, cauliflower, and onions. ¨ Have a variety of cut-up vegetables with a low-fat dip as an appetizer instead of chips and dip. ¨ Sprinkle sunflower seeds or chopped almonds over salads. Or try adding chopped walnuts or almonds to cooked vegetables. ¨ Try some vegetarian meals using beans and peas. Add garbanzo or kidney beans to salads. Make burritos and tacos with mashed lucas beans or black beans. Where can you learn more? Go to http://gomez-denise.info/. Enter H705 in the search box to learn more about \"DASH Diet: Care Instructions. \" Current as of: September 21, 2016 Content Version: 11.4 © 5863-2765 [employer]. Care instructions adapted under license by [employer] (which disclaims liability or warranty for this information). If you have questions about a medical condition or this instruction, always ask your healthcare professional. [place] [address] any warranty or liability for your use of this information. Introducing \[facility] [place] [facility]\"" & HEALTH SERVICES! Mundo Ordaz introduces [employer] patient portal. Now you can access parts of your medical record, email your doctor's office, and request medication refills online. 1. In your internet browser, go to [email or web address] 2. Click on the First Time User? Click Here link in the Sign In box. You will see the New Member Sign Up page. 3. Enter your [employer] Access Code exactly as it appears below. You will not need to use this code after youve completed the sign-up process. If you do not sign up before the expiration date, you must request a new code. · [employer] Access Code: [email or web address] Expires: 9/20/2018  2:16 PM 
 
4. Enter the last four digits of your Social Security Number (xxxx) and Date of Birth (mm/dd/yyyy) as indicated and click Submit. You will be taken to the next sign-up page. 5. Create a [employer] ID.  This will be your [employer] login ID and cannot be changed, so think of one that is secure and easy to remember. 6. Create a [employer] password. You can change your password at any time. 7. Enter your Password Reset Question and Answer. This can be used at a later time if you forget your password. 8. Enter your e-mail address. You will receive e-mail notification when new information is available in [address] 9. Click Sign Up. You can now view and download portions of your medical record. 10. Click the Download Summary menu link to download a portable copy of your medical information. If you have questions, please visit the Frequently Asked Questions section of the [employer] website. Remember, [employer] is NOT to be used for urgent needs. For medical emergencies, dial 911. Now available from your iPhone and Android! Please provide this summary of care documentation to your next provider. Your primary care clinician is listed as Heath Valera. If you have any questions after today's visit, please call [phone].

## 2018-06-22 NOTE — PROGRESS NOTES
Chief Complaint   Patient presents with    Follow-up     1B/6 mo       Lab Results   Component Value Date/Time    Hemoglobin A1c 5.7 (H) 04/26/2018 09:10 AM    Hemoglobin A1c 6.1 (H) 10/11/2016 10:07 AM    Hemoglobin A1c 5.8 (H) 02/09/2016 10:39 AM     Health Maintenance Due   Topic Date Due    PAP AKA CERVICAL CYTOLOGY  10/30/2016    ZOSTER VACCINE AGE 60>  08/24/2017     Coordination of Care Questions    1. Have you been to the ER, urgent care clinic since your last visit? No       Hospitalized since your last visit? No    2. Have you seen or consulted any other health care providers outside of the [address] [place] [address] since your last visit? Include any pap smears or colon screening.  remy Krueger mgt

## 2018-06-23 PROBLEM — G89.29 CHRONIC BILATERAL LOW BACK PAIN WITHOUT SCIATICA: Status: ACTIVE | Noted: 2018-06-23

## 2018-06-23 PROBLEM — E55.9 VITAMIN D DEFICIENCY: Status: ACTIVE | Noted: 2018-06-23

## 2018-06-23 PROBLEM — M54.50 CHRONIC BILATERAL LOW BACK PAIN WITHOUT SCIATICA: Status: ACTIVE | Noted: 2018-06-23

## 2018-06-23 PROBLEM — M17.11 PRIMARY OSTEOARTHRITIS OF RIGHT KNEE: Status: ACTIVE | Noted: 2018-06-23

## 2018-06-23 RX ORDER — DULOXETINE 40 MG/1
40 CAPSULE, DELAYED RELEASE ORAL DAILY
Qty: 30 CAP | Refills: 11 | Status: SHIPPED | OUTPATIENT
Start: 2018-06-23 | End: 2018-11-23 | Stop reason: SDUPTHER

## 2018-07-27 ENCOUNTER — OFFICE VISIT (OUTPATIENT)
Dept: INTERNAL MEDICINE CLINIC | Facility: CLINIC | Age: 63
End: 2018-07-27

## 2018-07-27 VITALS
WEIGHT: 206.8 LBS | HEIGHT: 63 IN | RESPIRATION RATE: 19 BRPM | OXYGEN SATURATION: 98 % | BODY MASS INDEX: 36.64 KG/M2 | DIASTOLIC BLOOD PRESSURE: 82 MMHG | SYSTOLIC BLOOD PRESSURE: 140 MMHG | HEART RATE: 94 BPM | TEMPERATURE: 98.7 F

## 2018-07-27 DIAGNOSIS — J06.9 UPPER RESPIRATORY VIRUS: ICD-10-CM

## 2018-07-27 DIAGNOSIS — Z13.31 SCREENING FOR DEPRESSION: ICD-10-CM

## 2018-07-27 DIAGNOSIS — J45.21 MILD INTERMITTENT ASTHMA WITH EXACERBATION: Primary | ICD-10-CM

## 2018-07-27 RX ORDER — PREDNISONE 20 MG/1
TABLET ORAL
Qty: 10 TAB | Refills: 0 | Status: SHIPPED | OUTPATIENT
Start: 2018-07-27 | End: 2018-12-27 | Stop reason: ALTCHOICE

## 2018-07-27 RX ORDER — ALBUTEROL SULFATE 90 UG/1
1 AEROSOL, METERED RESPIRATORY (INHALATION)
Qty: 1 INHALER | Refills: 0 | Status: SHIPPED | OUTPATIENT
Start: 2018-07-27 | End: 2021-03-05

## 2018-07-27 NOTE — PROGRESS NOTES
HISTORY OF PRESENT ILLNESS  Marshall Costello is a 63 y.o. female. HPI  She complains of 5 days of  Sore throoat, runny nose and cough. . Associated symptoms include left ear pressure/pain, headache described as top of head pain, shortness of breath, sinus pressure and wheezing chills. She denies facial pain and fever. Clinical course has been gradually worsening since that time. She has tried ibuprofen, Claritin and Robitussin with some temporary relief. Past history is significant for asthma. Patient is non-smoker    Patient complains of diarrhea. Onset of diarrhea was yesterday. Diarrhea is occurring approximately 3 times per day. Stool this morning was more formed. Patient describes diarrhea as loose. Patient denies significant abdominal pain, fever, blood in stool, recent antibiotic use, illness in household contacts.  Treatment to date: none       PHQ over the last two weeks 7/27/2018   Little interest or pleasure in doing things Not at all   Feeling down, depressed, irritable, or hopeless Not at all   Total Score PHQ 2 0       Patient Active Problem List   Diagnosis Code    Abnormal Pap smear [id number]    Asthma, mild intermittent J45.20    Hypertension, essential, benign I10    Obesity E66.9    GERD (gastroesophageal reflux disease) K21.9    Hyperlipidemia, mixed E78.2    Glucose intolerance (impaired glucose tolerance) R73.02    Severe obesity (BMI 35.0-39.9) ([facility]) E66.01    Primary osteoarthritis of right knee M17.11    Chronic bilateral low back pain without sciatica M54.5, G89.29    Vitamin D deficiency E55.9     Past Medical History:   Diagnosis Date    Alopecia areata     Asthmatic bronchitis     Cyst, breast     breast cyst on L     Hypercholesterolemia     Hypertension      Past Surgical History:   Procedure Laterality Date    HX BREAST BIOPSY Left     benign surgical bx    HX GYN      BTL     Social History     Social History    Marital status:      Spouse name: N/A   [address] [place] [address] Number of children: N/A    Years of education: N/A     Social History Main Topics    Smoking status: Former Smoker     Packs/day: 0.50     Years: 25.00     Quit date: 9/21/2015    Smokeless tobacco: Never Used    Alcohol use 0.0 oz/week     0 Standard drinks or equivalent per week      Comment: occasional    Drug use: None    Sexual activity: Yes     Partners: Male     Other Topics Concern    None     Social History Narrative     Family History   Problem Relation Age of Onset    Heart Disease Mother     Diabetes Mother     Cancer Mother      breast    Stroke Mother     Breast Cancer Mother      late 76s    Hypertension Father     Diabetes Father    [facility] Dementia Father     Hypertension Sister     Hypertension Sister     Hypertension Brother     Elevated Lipids Brother     Elevated Lipids Brother     Diabetes Maternal Aunt      breast     Allergies   Allergen Reactions    Penicillins Hives    Sulfa (Sulfonamide Antibiotics) Other (comments)     Bactrim - rash     Current Outpatient Prescriptions   Medication Sig Dispense Refill    DULoxetine 40 mg cpDR Take 40 mg by mouth daily. 30 Cap 11    lidocaine (LIDODERM) 5 % Apply 1 Patch to affected area.  ibuprofen (MOTRIN) 800 mg tablet Take 800 mg by mouth.  baclofen (LIORESAL) 10 mg tablet Take  by mouth three (3) times daily.  raNITIdine (ZANTAC) 300 mg tablet Take 1 Tab by mouth daily. 30 Tab 11    lisinopril-hydroCHLOROthiazide (PRINZIDE, ZESTORETIC) 10-12.5 mg per tablet take 1 tablet by mouth once daily 90 Tab 3    calcium carbonate (TUMS) 200 mg calcium (500 mg) chew Take 1 Tab by mouth daily. ROS     Visit Vitals    /83 (BP 1 Location: Right arm, BP Patient Position: Sitting)    Pulse 94    Temp 98.7 °F (37.1 °C) (Oral)    Resp 19    Ht 5' 3\" (1.6 m)    Wt 206 lb 12.8 oz (93.8 kg)    SpO2 98%    BMI 36.63 kg/m2     Physical Exam   Constitutional: She is oriented to person, place, and time.  She appears well-developed and well-nourished. HENT:   Head: Normocephalic and atraumatic. Right Ear: Tympanic membrane and ear canal normal.   Left Ear: Tympanic membrane and ear canal normal.   Nose: No mucosal edema. Mouth/Throat: Oropharynx is clear and moist and mucous membranes are normal. Mucous membranes are not pale and not dry. No oropharyngeal exudate, posterior oropharyngeal edema or posterior oropharyngeal erythema. Eyes: Conjunctivae are normal. Pupils are equal, round, and reactive to light. Right eye exhibits no discharge. Left eye exhibits no discharge. Neck: Neck supple. Cardiovascular: Normal rate, regular rhythm, S1 normal, S2 normal and normal heart sounds. Exam reveals no gallop. No murmur heard. Pulmonary/Chest: Effort normal. She has wheezes (end expiratroy and with forced expiration). She has no rhonchi. She has no rales. Lymphadenopathy:     She has no cervical adenopathy. Neurological: She is alert and oriented to person, place, and time. Skin: Skin is warm, dry and intact. No rash noted. Nursing note and vitals reviewed. ASSESSMENT and PLAN    ICD-10-CM ICD-9-CM    1. Mild intermittent asthma with exacerbation J45.21 493.92 predniSONE (DELTASONE) 20 mg tablet      albuterol (PROVENTIL HFA, VENTOLIN HFA, PROAIR HFA) 90 mcg/actuation inhaler   2. Upper respiratory virus J06.9 465.9    3. Screening for depression Z13.89 V79.0 BEHAV ASSMT W/SCORE & DOCD/STAND INSTRUMENT     Diagnoses and all orders for this visit:    1. Mild intermittent asthma with exacerbation  -     predniSONE (DELTASONE) 20 mg tablet; Take 2 daily with food  -     albuterol (PROVENTIL HFA, VENTOLIN HFA, PROAIR HFA) 90 mcg/actuation inhaler; Take 1 Puff by inhalation every four (4) hours as needed for Wheezing. 2. Upper respiratory virus  If sinus congestion not improving by 10th day of illness, consider antibiotic.      3. Screening for depression  -     BEHAV ASSMT W/SCORE & DOCD/STAND INSTRUMENT      Follow-up Disposition:  Return if symptoms worsen or fail to improve. reviewed diet, exercise and weight control  I have discussed the diagnosis, evaluation and treatment options and the intended plan with the patient. Patient understands and is in agreement. The patient has received an after-visit summary and questions were answered concerning future plans. I have discussed side effects and warnings of any new medications with the patient as well.

## 2018-07-27 NOTE — PATIENT INSTRUCTIONS
Prednisone 20 mg 2 tablets once a day for 5 days  Use albuterol inhaler every 4 hours as needed for cough and wheezing. Call next Wednesday if sinus congestion is not improving; may need antibiotic at that point  In the meantime try Afrin Nasal [place] (or store brand equivalent) for sinus and nasal congestion, but use only for 3 days. Asthma Attack: Care Instructions  Your Care Instructions    During an asthma attack, the airways swell and narrow. This makes it hard to breathe. Severe asthma attacks can be life-threatening, but you can help prevent them by keeping your asthma under control and treating symptoms before they get bad. Symptoms include being short of breath, having chest tightness, coughing, and wheezing. Noting and treating these symptoms can also help you avoid future trips to the emergency room. The doctor has checked you carefully, but problems can develop later. If you notice any problems or new symptoms, get medical treatment right away. Follow-up care is a key part of your treatment and safety. Be sure to make and go to all appointments, and call your doctor if you are having problems. It's also a good idea to know your test results and keep a list of the medicines you take. How can you care for yourself at home? · Follow your asthma action plan to prevent and treat attacks. If you don't have an asthma action plan, work with your doctor to create one. · Take your asthma medicines exactly as prescribed. Talk to your doctor right away if you have any questions about how to take them. ¨ Use your quick-relief medicine when you have symptoms of an attack. Quick-relief medicine is usually an albuterol inhaler. Some people need to use quick-relief medicine before they exercise. ¨ Take your controller medicine every day, not just when you have symptoms. Controller medicine is usually an inhaled corticosteroid. The goal is to prevent problems before they occur.  Don't use your controller medicine to treat an attack that has already started. It doesn't work fast enough to help. ¨ If your doctor prescribed corticosteroid pills to use during an attack, take them exactly as prescribed. It may take hours for the pills to work, but they may make the episode shorter and help you breathe better. ¨ Keep your quick-relief medicine with you at all times. · Talk to your doctor before using other medicines. Some medicines, such as aspirin, can cause asthma attacks in some people. · If you have a peak flow meter, use it to check how well you are breathing. This can help you predict when an asthma attack is going to occur. Then you can take medicine to prevent the asthma attack or make it less severe. · Do not smoke or allow others to smoke around you. Avoid smoky places. Smoking makes asthma worse. If you need help quitting, talk to your doctor about stop-smoking programs and medicines. These can increase your chances of quitting for good. · Learn what triggers an asthma attack for you, and avoid the triggers when you can. Common triggers include colds, smoke, air pollution, dust, pollen, mold, pets, cockroaches, stress, and cold air. · Avoid colds and the flu. Get a pneumococcal vaccine shot. If you have had one before, ask your doctor if you need a second dose. Get a flu vaccine every fall. If you must be around people with colds or the flu, wash your hands often. When should you call for help? Call 911 anytime you think you may need emergency care.  For example, call if:    · You have severe trouble breathing.    Call your doctor now or seek immediate medical care if:    · Your symptoms do not get better after you have followed your asthma action plan.     · You have new or worse trouble breathing.     · Your coughing and wheezing get worse.     · You cough up dark brown or bloody mucus (sputum).     · You have a new or higher fever.    Watch closely for changes in your health, and be sure to contact your doctor if:    · You need to use quick-relief medicine on more than 2 days a week (unless it is just for exercise).     · You cough more deeply or more often, especially if you notice more mucus or a change in the color of your mucus.     · You are not getting better as expected. Where can you learn more? Go to http://gomez-denise.info/. Enter D334 in the search box to learn more about \"Asthma Attack: Care Instructions. \"  Current as of: December 6, 2017  Content Version: 11.7  © 6160-8307 [employer]. Care instructions adapted under license by [employer] (which disclaims liability or warranty for this information). If you have questions about a medical condition or this instruction, always ask your healthcare professional. [address] any warranty or liability for your use of this information. Using a Metered-Dose Inhaler: Care Instructions  Your Care Instructions    A metered-dose inhaler lets you breathe medicine into your lungs quickly. Inhaled medicine works faster than the same medicine in a pill. An inhaler allows you to take less medicine than you would need if you took it as a pill. \"Metered-dose\" means that the inhaler gives a measured amount of medicine each time you use it. A metered-dose inhaler gives medicine in the form of a liquid mist.  Your doctor may want you to use a spacer with your inhaler. A spacer is a chamber that you attach to the inhaler. The chamber holds the medicine before you inhale it. That way, you can inhale the medicine in as many breaths as you need. Doctors recommend using a spacer with most metered-dose inhalers, especially those with corticosteroid medicines. Follow-up care is a key part of your treatment and safety. Be sure to make and go to all appointments, and call your doctor if you are having problems.  It's also a good idea to know your test results and keep a list of the medicines you take. How can you care for yourself at home? To get started using your inhaler  · Talk with your health care provider to be sure you are using your inhaler the right way. It might help if you practice using it in front of a mirror. Use the inhaler exactly as prescribed. · Check that you have the correct medicine. If you use more than one inhaler, put a label on each one. This will let you know which one to use at the right time. · Keep track of how much medicine is in the inhaler. Check the label to see how many doses are in the container. If you know how many puffs you can take, you can replace the inhaler before you run out. Ask your health care provider how you can keep track of how much medicine is left. · Use a spacer if you have problems pressing the inhaler and breathing in at the same time. You also may need a spacer if you are using corticosteroid medicines. · If you are using a corticosteroid inhaler, gargle and rinse out your mouth with water after use. Do not swallow the water. Swallowing the water will increase the chance that the medicine will get into your bloodstream. This may make it more likely that you will have side effects. To use an inhaler without a spacer  1. Shake the inhaler as directed. Remove the cap. Check the instructions to see if you need to prime your inhaler before you use it. If it needs priming, follow the instructions on how to prime your inhaler. 2. Hold the inhaler upright with the mouthpiece at the bottom. 3. Tilt your head back a little, and breathe out slowly and completely. 4. Position the inhaler in one of two ways:  ¨ You can place the inhaler in your mouth. This is easier for most people. And it lowers the risk that any of the medicine will get into your eyes. ¨ Or you can place the inhaler 1 to 2 inches in front of your open mouth, without closing your lips over it. Try to open your mouth as wide as you can.  Placing the inhaler in front of your open mouth may be better for getting the medicine into your lungs. But some people may find this too hard to do. 5. Start taking slow, even breaths through your mouth. Press down on the inhaler once, then inhale fully. 6. Hold your breath for 10 seconds. This will let the medicine settle in your lungs. 7. If you need to take a second dose, wait 30 to 60 seconds to allow the inhaler valve to refill. Where can you learn more? Go to http://gomez-denise.info/. Enter K111 in the search box to learn more about \"Using a Metered-Dose Inhaler: Care Instructions. \"  Current as of: November 12, 2017  Content Version: 11.7  © 9417-4365 [employer]. Care instructions adapted under license by [employer] (which disclaims liability or warranty for this information). If you have questions about a medical condition or this instruction, always ask your healthcare professional. [place] [address] any warranty or liability for your use of this information.

## 2018-07-27 NOTE — MR AVS SNAPSHOT
[address] [address] [phone] Patient: Sravanthi Dexter MRN: [id number] :[id number] Visit Information Date & Time Provider Department Dept. Phone Encounter #  
 2018  9:45 AM MD [facility] [address] [id number] Follow-up Instructions Return if symptoms worsen or fail to improve. Your Appointments 2018  8:30 AM  
[facility] [place] ([address]) Appt Note: Fasting Labs [facility]) [place] [address] [phone]  
  
   
 [place] [address]  
  
    
 2018  2:30 PM  
ROUTINE CARE with MD Nazario Garzon [facility]) Appt Note: 6mth f/u; HTN, chol, gluc  Fasting lab one week prior [place] 7 [phone]  
  
   
 [address] [facility] Upcoming Health Maintenance Date Due  
 PAP AKA CERVICAL CYTOLOGY 10/30/2016 ZOSTER VACCINE AGE 60> 2017 Influenza Age 5 to Adult 2018 BREAST CANCER SCRN MAMMOGRAM 2019 COLONOSCOPY 2024 DTaP/Tdap/Td series (2 - Td) 2025 Allergies as of 2018  Review Complete On: 2018 By: Samina Greer MD  
  
 Severity Noted Reaction Type Reactions Penicillins  2017    Hives Sulfa (Sulfonamide Antibiotics)  2010    Other (comments) Bactrim - rash Current Immunizations  Reviewed on 8/15/2017 Name Date Influenza Vaccine 2014 TD Vaccine 1999 Tdap 2015 Not reviewed this visit You Were Diagnosed With   
  
 Codes Comments Mild intermittent asthma with exacerbation    -  Primary ICD-10-CM: J45.21 ICD-9-CM: 417.37 Upper respiratory virus     ICD-10-CM: J06.9 ICD-9-CM: 465.9 Vitals BP Pulse Temp Resp Height(growth percentile) Weight(growth percentile) 140/82 (BP 1 Location: Right arm, BP Patient Position: Sitting) 94 98.7 °F (37.1 °C) (Oral) 19 5' 3\" (1.6 m) 206 lb 12.8 oz (93.8 kg) SpO2 BMI OB Status Smoking Status 98% 36.63 kg/m2 Postmenopausal Former Smoker Vitals History BMI and BSA Data Body Mass Index Body Surface Area  
 36.63 kg/m 2 2.04 m 2 Preferred Pharmacy Pharmacy Name Phone [phone] [phone] Your Updated Medication List  
  
   
This list is accurate as of 7/27/18 10:29 AM.  Always use your most recent med list.  
  
  
  
  
 albuterol 90 mcg/actuation inhaler Commonly known as:  PROVENTIL HFA, VENTOLIN HFA, PROAIR HFA Take 1 Puff by inhalation every four (4) hours as needed for Wheezing. baclofen 10 mg tablet Commonly known as:  LIORESAL Take  by mouth three (3) times daily. calcium carbonate 200 mg calcium (500 mg) Chew Commonly known as:  TUMS Take 1 Tab by mouth daily. DULoxetine 40 mg Cpdr  
Take 40 mg by mouth daily. ibuprofen 800 mg tablet Commonly known as:  MOTRIN Take 800 mg by mouth.  
  
 lidocaine 5 % Commonly known as:  Hildegarde Radha Apply 1 Patch to affected area. lisinopril-hydroCHLOROthiazide 10-12.5 mg per tablet Commonly known as:  PRINZIDE, ZESTORETIC  
take 1 tablet by mouth once daily  
  
 predniSONE 20 mg tablet Commonly known as:  Nanine Knife Take 2 daily with food  
  
 raNITIdine 300 mg tablet Commonly known as:  ZANTAC Take 1 Tab by mouth daily. Prescriptions Sent to Pharmacy Refills  
 predniSONE (DELTASONE) 20 mg tablet 0 Sig: Take 2 daily with food  Class: Normal  
 [facility]: [phone]  
 albuterol (PROVENTIL HFA, VENTOLIN HFA, PROAIR HFA) 90 mcg/actuation inhaler 0  
 Sig: Take 1 Puff by inhalation every four (4) hours as needed for Wheezing. Class: Normal  
 Pharmacy: [address]  #: [phone] Route: Inhalation Follow-up Instructions Return if symptoms worsen or fail to improve. Patient Instructions Prednisone 20 mg 2 tablets once a day for 5 days Use albuterol inhaler every 4 hours as needed for cough and wheezing. Call next Wednesday if sinus congestion is not improving; may need antibiotic at that point In the meantime try Afrin Nasal [place] (or store brand equivalent) for sinus and nasal congestion, but use only for 3 days. Asthma Attack: Care Instructions Your Care Instructions During an asthma attack, the airways swell and narrow. This makes it hard to breathe. Severe asthma attacks can be life-threatening, but you can help prevent them by keeping your asthma under control and treating symptoms before they get bad. Symptoms include being short of breath, having chest tightness, coughing, and wheezing. Noting and treating these symptoms can also help you avoid future trips to the emergency room. The doctor has checked you carefully, but problems can develop later. If you notice any problems or new symptoms, get medical treatment right away. Follow-up care is a key part of your treatment and safety. Be sure to make and go to all appointments, and call your doctor if you are having problems. It's also a good idea to know your test results and keep a list of the medicines you take. How can you care for yourself at home? · Follow your asthma action plan to prevent and treat attacks. If you don't have an asthma action plan, work with your doctor to create one. · Take your asthma medicines exactly as prescribed. Talk to your doctor right away if you have any questions about how to take them. ¨ Use your quick-relief medicine when you have symptoms of an attack. Quick-relief medicine is usually an albuterol inhaler. Some people need to use quick-relief medicine before they exercise. ¨ Take your controller medicine every day, not just when you have symptoms. Controller medicine is usually an inhaled corticosteroid. The goal is to prevent problems before they occur. Don't use your controller medicine to treat an attack that has already started. It doesn't work fast enough to help. ¨ If your doctor prescribed corticosteroid pills to use during an attack, take them exactly as prescribed. It may take hours for the pills to work, but they may make the episode shorter and help you breathe better. ¨ Keep your quick-relief medicine with you at all times. · Talk to your doctor before using other medicines. Some medicines, such as aspirin, can cause asthma attacks in some people. · If you have a peak flow meter, use it to check how well you are breathing. This can help you predict when an asthma attack is going to occur. Then you can take medicine to prevent the asthma attack or make it less severe. · Do not smoke or allow others to smoke around you. Avoid smoky places. Smoking makes asthma worse. If you need help quitting, talk to your doctor about stop-smoking programs and medicines. These can increase your chances of quitting for good. · Learn what triggers an asthma attack for you, and avoid the triggers when you can. Common triggers include colds, smoke, air pollution, dust, pollen, mold, pets, cockroaches, stress, and cold air. · Avoid colds and the flu. Get a pneumococcal vaccine shot. If you have had one before, ask your doctor if you need a second dose. Get a flu vaccine every fall. If you must be around people with colds or the flu, wash your hands often. When should you call for help? Call 911 anytime you think you may need emergency care. For example, call if: 
  · You have severe trouble breathing.  
 Call your doctor now or seek immediate medical care if:   · Your symptoms do not get better after you have followed your asthma action plan.  
  · You have new or worse trouble breathing.  
  · Your coughing and wheezing get worse.  
  · You cough up dark brown or bloody mucus (sputum).  
  · You have a new or higher fever.  
 Watch closely for changes in your health, and be sure to contact your doctor if: 
  · You need to use quick-relief medicine on more than 2 days a week (unless it is just for exercise).  
  · You cough more deeply or more often, especially if you notice more mucus or a change in the color of your mucus.  
  · You are not getting better as expected. Where can you learn more? Go to http://gomez-denise.info/. Enter Y225 in the search box to learn more about \"Asthma Attack: Care Instructions. \" Current as of: December 6, 2017 Content Version: 11.7 © 7850-2611 [employer]. Care instructions adapted under license by [employer] (which disclaims liability or warranty for this information). If you have questions about a medical condition or this instruction, always ask your healthcare professional. [address] any warranty or liability for your use of this information. Using a Metered-Dose Inhaler: Care Instructions Your Care Instructions A metered-dose inhaler lets you breathe medicine into your lungs quickly. Inhaled medicine works faster than the same medicine in a pill. An inhaler allows you to take less medicine than you would need if you took it as a pill. \"Metered-dose\" means that the inhaler gives a measured amount of medicine each time you use it. A metered-dose inhaler gives medicine in the form of a liquid mist. 
Your doctor may want you to use a spacer with your inhaler. A spacer is a chamber that you attach to the inhaler. The chamber holds the medicine before you inhale it.  That way, you can inhale the medicine in as many breaths as you need. Doctors recommend using a spacer with most metered-dose inhalers, especially those with corticosteroid medicines. Follow-up care is a key part of your treatment and safety. Be sure to make and go to all appointments, and call your doctor if you are having problems. It's also a good idea to know your test results and keep a list of the medicines you take. How can you care for yourself at home? To get started using your inhaler · Talk with your health care provider to be sure you are using your inhaler the right way. It might help if you practice using it in front of a mirror. Use the inhaler exactly as prescribed. · Check that you have the correct medicine. If you use more than one inhaler, put a label on each one. This will let you know which one to use at the right time. · Keep track of how much medicine is in the inhaler. Check the label to see how many doses are in the container. If you know how many puffs you can take, you can replace the inhaler before you run out. Ask your health care provider how you can keep track of how much medicine is left. · Use a spacer if you have problems pressing the inhaler and breathing in at the same time. You also may need a spacer if you are using corticosteroid medicines. · If you are using a corticosteroid inhaler, gargle and rinse out your mouth with water after use. Do not swallow the water. Swallowing the water will increase the chance that the medicine will get into your bloodstream. This may make it more likely that you will have side effects. To use an inhaler without a spacer 1. Shake the inhaler as directed. Remove the cap. Check the instructions to see if you need to prime your inhaler before you use it. If it needs priming, follow the instructions on how to prime your inhaler. 2. Hold the inhaler upright with the mouthpiece at the bottom. 3. Tilt your head back a little, and breathe out slowly and completely. 4. Position the inhaler in one of two ways: 
¨ You can place the inhaler in your mouth. This is easier for most people. And it lowers the risk that any of the medicine will get into your eyes. ¨ Or you can place the inhaler 1 to 2 inches in front of your open mouth, without closing your lips over it. Try to open your mouth as wide as you can. Placing the inhaler in front of your open mouth may be better for getting the medicine into your lungs. But some people may find this too hard to do. 5. Start taking slow, even breaths through your mouth. Press down on the inhaler once, then inhale fully. 6. Hold your breath for 10 seconds. This will let the medicine settle in your lungs. 7. If you need to take a second dose, wait 30 to 60 seconds to allow the inhaler valve to refill. Where can you learn more? Go to http://gomez-denise.info/. Enter K111 in the search box to learn more about \"Using a Metered-Dose Inhaler: Care Instructions. \" Current as of: November 12, 2017 Content Version: 11.7 © 0591-5393 [employer]. Care instructions adapted under license by [employer] (which disclaims liability or warranty for this information). If you have questions about a medical condition or this instruction, always ask your healthcare professional. [place] [address] any warranty or liability for your use of this information. Introducing hospitals & HEALTH SERVICES! [employer] introduces [employer] patient portal. Now you can access parts of your medical record, email your doctor's office, and request medication refills online. 1. In your internet browser, go to [email or web address] 2. Click on the First Time User? Click Here link in the Sign In box. You will see the New Member Sign Up page. 3. Enter your [employer] Access Code exactly as it appears below. You will not need to use this code after youve completed the sign-up process.  If you do not sign up before the expiration date, you must request a new code. · [employer] Access Code: [email or web address] Expires: 9/20/2018  2:16 PM 
 
4. Enter the last four digits of your Social Security Number (xxxx) and Date of Birth (mm/dd/yyyy) as indicated and click Submit. You will be taken to the next sign-up page. 5. Create a [employer] ID. This will be your [employer] login ID and cannot be changed, so think of one that is secure and easy to remember. 6. Create a [employer] password. You can change your password at any time. 7. Enter your Password Reset Question and Answer. This can be used at a later time if you forget your password. 8. Enter your e-mail address. You will receive e-mail notification when new information is available in [address] 9. Click Sign Up. You can now view and download portions of your medical record. 10. Click the Download Summary menu link to download a portable copy of your medical information. If you have questions, please visit the Frequently Asked Questions section of the [employer] website. Remember, [employer] is NOT to be used for urgent needs. For medical emergencies, dial 911. Now available from your iPhone and Android! Please provide this summary of care documentation to your next provider. Your primary care clinician is listed as Anali Vieira. If you have any questions after today's visit, please call [phone].

## 2018-07-27 NOTE — PROGRESS NOTES
Health Maintenance Due   Topic Date Due    PAP AKA CERVICAL CYTOLOGY  10/30/2016    ZOSTER VACCINE AGE 60>  08/24/2017       Chief Complaint   Patient presents with    Cold Symptoms     x 5 days, non-productive    Diarrhea    Nausea       1. Have you been to the ER, urgent care clinic since your last visit? Hospitalized since your last visit? No    2. Have you seen or consulted any other health care providers outside of the [address] [place] [address] since your last visit? Include any pap smears or colon screening. No    3) Do you have an Advance Directive on file? no    4) Are you interested in receiving information on Advance Directives? NO      Patient is accompanied by self I have received verbal consent from Radha Henriquez to discuss any/all medical information while they are present in the room.

## 2018-07-27 NOTE — LETTER
NOTIFICATION RETURN TO WORK  
 
7/27/2018 10:31 AM 
 
Ms. Sravanthi Dexter [address] [place] [address] To Whom It May Concern: Sravanthi Dexter is currently under the care of [address] [place] [address]. She was seen in the office this morning and will return to work on: Monday July 30. If there are questions or concerns please have the patient contact our office.  
 
 
 
Sincerely, 
 
 
Samina Greer MD

## 2018-08-01 ENCOUNTER — TELEPHONE (OUTPATIENT)
Dept: INTERNAL MEDICINE CLINIC | Facility: CLINIC | Age: 63
End: 2018-08-01

## 2018-08-01 RX ORDER — DOXYCYCLINE 100 MG/1
100 CAPSULE ORAL 2 TIMES DAILY
Qty: 10 CAP | Refills: 0 | Status: SHIPPED | OUTPATIENT
Start: 2018-08-01 | End: 2018-12-27 | Stop reason: ALTCHOICE

## 2018-08-01 NOTE — TELEPHONE ENCOUNTER
Doxycycline sent to pharmacy. Avoid calcium, including TUMS while taking this. There is a 15% chance of skin reaction in the sun, but we have few options given her allergies. The effect can last for 2 weeks after last dose.

## 2018-08-01 NOTE — TELEPHONE ENCOUNTER
She is still having chest and head congestion, chest still feels tight. Cough is mostly at night but some during the day. Pt had some chills yesterday but none today. She would like an antibiotic sent to [facility].

## 2018-08-01 NOTE — TELEPHONE ENCOUNTER
----- Message from Jayant Cabezas sent at 8/1/2018 10:27 AM EDT -----  Regarding: Dr. Emily Contreras  Patient is still experiencing congestion and cough please send over a prescription to the [address] [place] [address]. Her number is [phone].

## 2018-10-10 ENCOUNTER — TELEPHONE (OUTPATIENT)
Dept: INTERNAL MEDICINE CLINIC | Facility: CLINIC | Age: 63
End: 2018-10-10

## 2018-10-10 NOTE — TELEPHONE ENCOUNTER
----- Message from Kate Quesada sent at 10/10/2018  9:07 AM EDT -----  Regarding: Dr. Xochitl Jimenez  Pt is requesting a referral sent to [address] [place] [address] [phone]. Had appt for 10/9/18; cancelled due to not having referral. Stated 2 attempt. Pt best contact [phone].

## 2018-10-10 NOTE — TELEPHONE ENCOUNTER
Ms. Karissa Bowers is calling back. She stated she needs a referral for PT and it was for knee surgery. She stated the physical therapist was Marily Zazueta. Tried to inform Ms. Karissa Bowers that the nurse had left for the afternoon and to see if she could have her office notes from the orthopedic faxed over but when I went to answer the line went out.

## 2018-10-11 NOTE — TELEPHONE ENCOUNTER
Arnoldo Rivera               Ms. May Holliday is calling back. She stated she needs a referral for PT and it was for knee surgery. She stated the physical therapist was Abigail Rodrigez.      Tried to inform Ms. May Holliday that the nurse had left for the afternoon and to see if she could have her office notes from the orthopedic faxed over but when I went to answer the line went out.

## 2018-10-11 NOTE — TELEPHONE ENCOUNTER
Pt called stating she had orthopedic surgery with Dr Nash Plasencia at the [facility]. Dr Nash Plasencia wanted PT, advised pt that we do not any information about surgery.  Suggested she call Dr Nash Plasencia for them to call Luis Bartholomew for referral.

## 2018-10-30 ENCOUNTER — HOSPITAL ENCOUNTER (OUTPATIENT)
Dept: MAMMOGRAPHY | Age: 63
Discharge: HOME | End: 2018-10-30
Attending: INTERNAL MEDICINE
Payer: COMMERCIAL

## 2018-10-30 DIAGNOSIS — Z12.31 VISIT FOR SCREENING MAMMOGRAM: ICD-10-CM

## 2018-10-30 PROCEDURE — 77067 SCR MAMMO BI INCL CAD: CPT

## 2018-11-14 ENCOUNTER — TELEPHONE (OUTPATIENT)
Dept: INTERNAL MEDICINE CLINIC | Facility: CLINIC | Age: 63
End: 2018-11-14

## 2018-11-14 DIAGNOSIS — M22.41 CHONDROMALACIA OF PATELLOFEMORAL JOINT, RIGHT: ICD-10-CM

## 2018-11-14 DIAGNOSIS — M23.311 DERANGEMENT OF ANTERIOR HORN OF MEDIAL MENISCUS OF RIGHT KNEE: Primary | ICD-10-CM

## 2018-11-14 NOTE — TELEPHONE ENCOUNTER
Ministerio Bowles from [facility], Dr Chiara Gonzalez's office, called and stated that pt has been seeing him since October. It was initially through workers comp, but they are now refusing to pay. Dr Gonzalez's office would like to see if we can give her a referral to continue care. Pt was referred there by [facility].  Pt needs a doctor's and insurance referral.  Ministerio Reyjose carlos can be reached at [phone] ext [phone].       DOS 10/05/2018  DX Code: M22.41    Lauren Ing III   Physician   Specialty   Orthopedic Surgery   Lauren Ing III   Physician   Primary Contact Information     Phone Fax E-mail Address   [phone] Not available [address] [facility] [address]    [address] [address]       Specialties    Orthopedic Surgery         Additional Contact Information     Phone Fax E-mail Address   Not available Not available Not available [facility] [address] [place] [address]       NPI AND UPIN     NPI NPI UPIN   Hardy Alisia [id number]   [facility] PROVIDER ID [[id number]    [address] [facility] [[id number]] [phone]    PROVIDER LINK [phone]] [phone]    [facility] PROVIDER ID [[id number]] [id number]    [address] PROVIDER ID [id number]

## 2018-11-19 ENCOUNTER — TELEPHONE (OUTPATIENT)
Dept: INTERNAL MEDICINE CLINIC | Facility: CLINIC | Age: 63
End: 2018-11-19

## 2018-11-23 DIAGNOSIS — F41.9 ANXIETY DISORDER, UNSPECIFIED TYPE: ICD-10-CM

## 2018-11-23 RX ORDER — DULOXETINE 40 MG/1
40 CAPSULE, DELAYED RELEASE ORAL DAILY
Qty: 30 CAP | Refills: 11 | Status: SHIPPED | OUTPATIENT
Start: 2018-11-23 | End: 2018-12-27 | Stop reason: DRUGHIGH

## 2018-11-23 NOTE — TELEPHONE ENCOUNTER
PCP: Yaron Castañeda MD     Last appt: 7/27/2018   Future Appointments   Date Time Provider Danna Vergarai   12/20/2018  8:30 AM LAB [facility] KHOI MOON   12/27/2018  2:30 PM Yaron Castañeda  [facility]        Requested Prescriptions     Pending Prescriptions Disp Refills    DULoxetine 40 mg cpDR 30 Cap 11     Sig: Take 40 mg by mouth daily.

## 2018-12-20 ENCOUNTER — LAB ONLY (OUTPATIENT)
Dept: INTERNAL MEDICINE CLINIC | Facility: CLINIC | Age: 63
End: 2018-12-20

## 2018-12-20 DIAGNOSIS — E78.2 HYPERLIPIDEMIA, MIXED: ICD-10-CM

## 2018-12-20 DIAGNOSIS — R73.02 GLUCOSE INTOLERANCE (IMPAIRED GLUCOSE TOLERANCE): ICD-10-CM

## 2018-12-21 LAB
CHOLEST SERPL-MCNC: 183 MG/DL (ref 100–199)
EST. AVERAGE GLUCOSE BLD GHB EST-MCNC: 120 MG/DL
HBA1C MFR BLD: 5.8 % (ref 4.8–5.6)
HDLC SERPL-MCNC: 43 MG/DL
LDLC SERPL CALC-MCNC: 120 MG/DL (ref 0–99)
TRIGL SERPL-MCNC: 99 MG/DL (ref 0–149)
VLDLC SERPL CALC-MCNC: 20 MG/DL (ref 5–40)

## 2018-12-27 ENCOUNTER — OFFICE VISIT (OUTPATIENT)
Dept: INTERNAL MEDICINE CLINIC | Facility: CLINIC | Age: 63
End: 2018-12-27

## 2018-12-27 VITALS
BODY MASS INDEX: 38.18 KG/M2 | OXYGEN SATURATION: 98 % | RESPIRATION RATE: 14 BRPM | HEIGHT: 63 IN | SYSTOLIC BLOOD PRESSURE: 134 MMHG | TEMPERATURE: 98 F | HEART RATE: 74 BPM | DIASTOLIC BLOOD PRESSURE: 69 MMHG | WEIGHT: 215.5 LBS

## 2018-12-27 DIAGNOSIS — E78.2 HYPERLIPIDEMIA, MIXED: ICD-10-CM

## 2018-12-27 DIAGNOSIS — R73.02 GLUCOSE INTOLERANCE (IMPAIRED GLUCOSE TOLERANCE): ICD-10-CM

## 2018-12-27 DIAGNOSIS — F41.9 ANXIETY DISORDER, UNSPECIFIED TYPE: ICD-10-CM

## 2018-12-27 DIAGNOSIS — E66.01 SEVERE OBESITY (BMI 35.0-35.9 WITH COMORBIDITY) (HCC): ICD-10-CM

## 2018-12-27 DIAGNOSIS — I10 HYPERTENSION, ESSENTIAL, BENIGN: Primary | ICD-10-CM

## 2018-12-27 RX ORDER — DULOXETINE 40 MG/1
40 CAPSULE, DELAYED RELEASE ORAL DAILY
Qty: 90 CAP | Refills: 3 | Status: CANCELLED | OUTPATIENT
Start: 2018-12-27

## 2018-12-27 RX ORDER — DULOXETIN HYDROCHLORIDE 60 MG/1
60 CAPSULE, DELAYED RELEASE ORAL DAILY
Qty: 90 CAP | Refills: 3 | Status: SHIPPED | OUTPATIENT
Start: 2018-12-27 | End: 2019-11-04

## 2018-12-27 NOTE — PATIENT INSTRUCTIONS
Increase duloxetine to 60 mg daily. Office visit in 6 months with non-fasting lab a week before visit. Learning About Anxiety Disorders  What are anxiety disorders? Anxiety disorders are a type of medical problem. They cause severe anxiety. When you feel anxious, you feel that something bad is about to happen. This feeling interferes with your life. These disorders include:  · Generalized anxiety disorder. You feel worried and stressed about many everyday events and activities. This goes on for several months and disrupts your life on most days. · Panic disorder. You have repeated panic attacks. A panic attack is a sudden, intense fear or anxiety. It may make you feel short of breath. Your heart may pound. · Social anxiety disorder. You feel very anxious about what you will say or do in front of people. For example, you may be scared to talk or eat in public. This problem affects your daily life. · Phobias. You are very scared of a specific object, situation, or activity. For example, you may fear spiders, high places, or small spaces. What are the symptoms? Generalized anxiety disorder  Symptoms may include:  · Feeling worried and stressed about many things almost every day. · Feeling tired or irritable. You may have a hard time concentrating. · Having headaches or muscle aches. · Having a hard time getting to sleep or staying asleep. Panic disorder  You may have repeated panic attacks when there is no reason for feeling afraid. You may change your daily activities because you worry that you will have another attack. Symptoms may include:  · Intense fear, terror, or anxiety. · Trouble breathing or very fast breathing. · Chest pain or tightness. · A heartbeat that races or is not regular. Social anxiety disorder  Symptoms may include:  · Fear about a social situation, such as eating in front of others or speaking in public. You may worry a lot.  Or you may be afraid that something bad will happen. · Anxiety that can cause you to blush, sweat, and feel shaky. · A heartbeat that is faster than normal.  · A hard time focusing. Phobias  Symptoms may include:  · More fear than most people of being around an object, being in a situation, or doing an activity. You might also be stressed about the chance of being around the thing you fear. · Worry about losing control, panicking, fainting, or having physical symptoms like a faster heartbeat when you are around the situation or object. How are these disorders treated? Anxiety disorders can be treated with medicines or counseling. A combination of both may be used. Medicines may include:  · Antidepressants. These may help your symptoms by keeping chemicals in your brain in balance. · Benzodiazepines. These may give you short-term relief of your symptoms. Some people use cognitive-behavioral therapy. A therapist helps you learn to change stressful or bad thoughts into helpful thoughts. Lead a healthy lifestyle  A healthy lifestyle may help you feel better. · Get at least 30 minutes of exercise on most days of the week. Walking is a good choice. · Eat a healthy diet. Include fruits, vegetables, lean proteins, and whole grains in your diet each day. · Try to go to bed at the same time every night. Try for 8 hours of sleep a night. · Find ways to manage stress. Try relaxation exercises. · Avoid alcohol and illegal drugs. Follow-up care is a key part of your treatment and safety. Be sure to make and go to all appointments, and call your doctor if you are having problems. It's also a good idea to know your test results and keep a list of the medicines you take. Where can you learn more? Go to http://gomez-denise.info/. Enter M084 in the search box to learn more about \"Learning About Anxiety Disorders. \"  Current as of: December 7, 2017  Content Version: 11.8  © 7099-5943 [employer]wise, Incorporated.  Care instructions adapted under license by [employer] (which disclaims liability or warranty for this information). If you have questions about a medical condition or this instruction, always ask your healthcare professional. [address] any warranty or liability for your use of this information.

## 2018-12-27 NOTE — PROGRESS NOTES
[facility]  Identified pt with two pt identifiers(name and ). Chief Complaint   Patient presents with    Blood Pressure Check    Cholesterol Problem    Blood sugar problem       Reviewed record In preparation for visit and have obtained necessary documentation. Has info on advanced directive but has not filled them out. 1. Have you been to the ER, urgent care clinic or hospitalized since your last visit? 18  [facility] surgical [place] [facility]    2. Have you seen or consulted any other health care providers outside of the [address] [place] [address] since your last visit? Include any pap smears or colon screening. Dr Nia Sanz, mammogram    Vitals reviewed with provider.     Health Maintenance reviewed: pap with Dr Heather Vernon Maintenance Due   Topic    Shingrix Vaccine Age 50> (1 of 2)    PAP AKA CERVICAL CYTOLOGY           Wt Readings from Last 3 Encounters:   18 215 lb 8 oz (97.8 kg)   18 206 lb 12.8 oz (93.8 kg)   18 199 lb (90.3 kg)        Temp Readings from Last 3 Encounters:   18 98 °F (36.7 °C) (Oral)   18 98.7 °F (37.1 °C) (Oral)   18 98.2 °F (36.8 °C) (Oral)        BP Readings from Last 3 Encounters:   18 134/69   18 140/82   18 134/86        Pulse Readings from Last 3 Encounters:   18 74   18 94   18 71        Vitals:    18 1448   BP: 134/69   Pulse: 74   Resp: 14   Temp: 98 °F (36.7 °C)   TempSrc: Oral   SpO2: 98%   Weight: 215 lb 8 oz (97.8 kg)   Height: 5' 3\" (1.6 m)   PainSc:   0 - No pain          Learning Assessment:   :       Learning Assessment 2014   PRIMARY LEARNER Patient   PRIMARY LANGUAGE ENGLISH   LEARNER PREFERENCE PRIMARY DEMONSTRATION   ANSWERED BY patient   RELATIONSHIP SELF        Depression Screening:   :       PHQ over the last two weeks 2018   Little interest or pleasure in doing things Not at all   Feeling down, depressed, irritable, or hopeless Not at all   Total Score PHQ 2 0 Fall Risk Assessment:   :       Fall Risk Assessment, last 12 mths 7/27/2018   Able to walk? Yes   Fall in past 12 months? No        Abuse Screening:   :       Abuse Screening Questionnaire 7/27/2018   Do you ever feel afraid of your partner? N   Are you in a relationship with someone who physically or mentally threatens you? N   Is it safe for you to go home?  Y        ADL Screening:   :       ADL Assessment 1/23/2015   Feeding yourself No Help Needed   Getting from bed to chair No Help Needed   Getting dressed No Help Needed   Bathing or showering No Help Needed   Walk across the room (includes cane/walker) No Help Needed   Using the telphone No Help Needed   Taking your medications No Help Needed   Preparing meals No Help Needed   Managing money (expenses/bills) No Help Needed   Moderately strenuous housework (laundry) No Help Needed   Shopping for personal items (toiletries/medicines) No Help Needed   Shopping for groceries No Help Needed   Driving No Help Needed   Climbing a flight of stairs No Help Needed   Getting to places beyond walking distances No Help Needed

## 2018-12-27 NOTE — PROGRESS NOTES
HISTORY OF PRESENT ILLNESS  Cristiano Guerin is a 66 y.o. female. HPI  She presents for follow up of hypertension, hyperlipidemia, obesity and glucose intolerance. Diet and Lifestyle: generally follows a low fat low cholesterol diet, generally follows a low sodium diet, does not rigorously follow a diabetic diet, sedentary, nonsmoker  Medication compliance: compliant all of the time  Medication side effects: none  Home BP Monitoring: <140/70  Cardiovascular ROS:  She complains of lower extremity edema. She denies palpitations, orthopnea, exertional chest pressure/discomfort, claudication, dyspnea on exertion, dizziness     She presents for follow up of anxiety. Current therapy includes: duloxetine. With therapy symptoms are improved  Ongoing symptoms include:  decreased sleep, fear of impending doom and feelings of apprehension/worry  Patient denies: irrational fears, loss of appetite, poor concentration/attention, racing thoughts, restlessness and social avoidance   Reported side effects from the treatment: none.     Patient Active Problem List   Diagnosis Code    Asthma, mild intermittent J45.20    Hypertension, essential, benign I10    GERD (gastroesophageal reflux disease) K21.9    Hyperlipidemia, mixed E78.2    Glucose intolerance (impaired glucose tolerance) R73.02    Severe obesity (BMI 35.0-35.9 with comorbidity) ([facility]) E66.01, Z68.35    Primary osteoarthritis of right knee M17.11    Chronic bilateral low back pain without sciatica M54.5, G89.29    Vitamin D deficiency E55.9     Past Medical History:   Diagnosis Date    Abnormal Pap smear 3/22/2011    cinization 1996     Alopecia areata     Asthmatic bronchitis     Cyst, breast     breast cyst on L     Hypercholesterolemia     Hypertension      Past Surgical History:   Procedure Laterality Date    HX BREAST BIOPSY Left     benign surgical bx    HX GYN      BTL     Social History     Socioeconomic History    Marital status:  Spouse name: Not on file    Number of children: Not on file    Years of education: Not on file    Highest education level: Not on file   Tobacco Use    Smoking status: Former Smoker     Packs/day: 0.50     Years: 25.00     Pack years: 12.50     Last attempt to quit: 9/21/2015     Years since quitting: 3.2    Smokeless tobacco: Never Used   Substance and Sexual Activity    Alcohol use: Yes     Alcohol/week: 0.0 oz     Comment: occasional    Sexual activity: Yes     Partners: Male     Family History   Problem Relation Age of Onset    Heart Disease Mother     Diabetes Mother     Cancer Mother         breast    Stroke Mother     Breast Cancer Mother         late 74s    Hypertension Father     Diabetes Father    Valentina.Sofia Dementia Father     Hypertension Sister     Hypertension Sister     Hypertension Brother     Elevated Lipids Brother     Elevated Lipids Brother     Diabetes Maternal Aunt         breast    Breast Cancer Maternal Aunt         possible 76s     Allergies   Allergen Reactions    Penicillins Hives    Sulfa (Sulfonamide Antibiotics) Other (comments)     Bactrim - rash     Current Outpatient Medications   Medication Sig Dispense Refill    DULoxetine 40 mg cpDR Take 40 mg by mouth daily. 30 Cap 11    albuterol (PROVENTIL HFA, VENTOLIN HFA, PROAIR HFA) 90 mcg/actuation inhaler Take 1 Puff by inhalation every four (4) hours as needed for Wheezing. 1 Inhaler 0    lidocaine (LIDODERM) 5 % Apply 1 Patch to affected area.  ibuprofen (MOTRIN) 800 mg tablet Take 800 mg by mouth.  raNITIdine (ZANTAC) 300 mg tablet Take 1 Tab by mouth daily. 30 Tab 11    lisinopril-hydroCHLOROthiazide (PRINZIDE, ZESTORETIC) 10-12.5 mg per tablet take 1 tablet by mouth once daily 90 Tab 3    calcium carbonate (TUMS) 200 mg calcium (500 mg) chew Take 1 Tab by mouth daily. Review of Systems   Constitutional: Negative for malaise/fatigue and weight loss.    Gastrointestinal: Negative for constipation and diarrhea. Musculoskeletal: Positive for joint pain (right knee). Negative for back pain. Neurological: Negative for tingling and focal weakness. Visit Vitals  /69 (BP 1 Location: Left arm, BP Patient Position: Sitting)   Pulse 74   Temp 98 °F (36.7 °C) (Oral)   Resp 14   Ht 5' 3\" (1.6 m)   Wt 215 lb 8 oz (97.8 kg)   SpO2 98%   BMI 38.17 kg/m²     Physical Exam   Constitutional: She is oriented to person, place, and time. She appears well-developed and well-nourished. HENT:   Head: Normocephalic and atraumatic. Eyes: Conjunctivae are normal. Pupils are equal, round, and reactive to light. Neck: Neck supple. Carotid bruit is not present. No thyromegaly present. Cardiovascular: Normal rate, regular rhythm and normal heart sounds. PMI is not displaced. Exam reveals no gallop. No murmur heard. Pulses:       Dorsalis pedis pulses are 2+ on the right side, and 2+ on the left side. Posterior tibial pulses are 2+ on the right side, and 2+ on the left side. Pulmonary/Chest: Effort normal. She has no wheezes. She has no rhonchi. She has no rales. Abdominal: Soft. Normal appearance. She exhibits no abdominal bruit and no mass. There is no hepatosplenomegaly. There is no tenderness. Musculoskeletal: She exhibits no edema. Lymphadenopathy:     She has no cervical adenopathy. Right: No supraclavicular adenopathy present. Left: No supraclavicular adenopathy present. Neurological: She is alert and oriented to person, place, and time. No sensory deficit. Skin: Skin is warm, dry and intact. No rash noted. Psychiatric: She has a normal mood and affect. Her behavior is normal.   Nursing note and vitals reviewed.     Results for orders placed or performed in visit on 12/20/18   HEMOGLOBIN A1C WITH EAG   Result Value Ref Range    Hemoglobin A1c 5.8 (H) 4.8 - 5.6 %    Estimated average glucose 120 mg/dL   LIPID PANEL   Result Value Ref Range    Cholesterol, total 183 100 - 199 mg/dL    Triglyceride 99 0 - 149 mg/dL    HDL Cholesterol 43 >39 mg/dL    VLDL, calculated 20 5 - 40 mg/dL    LDL, calculated 120 (H) 0 - 99 mg/dL     Lab Results   Component Value Date/Time    Sodium 143 04/26/2018 09:10 AM    Potassium 4.5 04/26/2018 09:10 AM    Chloride 101 04/26/2018 09:10 AM    CO2 26 04/26/2018 09:10 AM    Glucose 95 04/26/2018 09:10 AM    BUN 13 04/26/2018 09:10 AM    Creatinine 0.75 04/26/2018 09:10 AM    BUN/Creatinine ratio 17 04/26/2018 09:10 AM    GFR est  04/26/2018 09:10 AM    GFR est non-AA 87 04/26/2018 09:10 AM    Calcium 9.5 04/26/2018 09:10 AM    Bilirubin, total 0.6 04/26/2018 09:10 AM    AST (SGOT) 13 04/26/2018 09:10 AM    Alk. phosphatase 85 04/26/2018 09:10 AM    Protein, total 6.9 04/26/2018 09:10 AM    Albumin 3.9 04/26/2018 09:10 AM    A-G Ratio 1.3 04/26/2018 09:10 AM    ALT (SGPT) 13 04/26/2018 09:10 AM         ASSESSMENT and PLAN    ICD-10-CM ICD-9-CM    1. Hypertension, essential, benign I10 401.1    2. Hyperlipidemia, mixed P07.0 471.8 METABOLIC PANEL, COMPREHENSIVE   3. Glucose intolerance (impaired glucose tolerance) R73.02 790.22 HEMOGLOBIN A1C WITH EAG   4. Severe obesity (BMI 35.0-35.9 with comorbidity) ([facility]) E66.01 278.01     Z68.35 V85.35    5. Anxiety disorder, unspecified type F41.9 300.00 DULoxetine (CYMBALTA) 60 mg capsule     Diagnoses and all orders for this visit:    1. Hypertension, essential, benign  Hypertension is controlled    2. Hyperlipidemia, mixed  Hyperlipidemia is adequtely controlled given risk factors. -     METABOLIC PANEL, COMPREHENSIVE; Future    3. Glucose intolerance (impaired glucose tolerance)  A1c is above her prior value of 5.7 in April.   -     HEMOGLOBIN A1C WITH EAG; Future    4. Severe obesity (BMI 35.0-35.9 with comorbidity) ([facility] [address]  Discussed using smaller plate for portion control, keeping a food diary for awareness of food consumed, checking weight often, and increasing physical activity. Will re-evaluate next visit. 5. Anxiety disorder, unspecified type  Has residual symptoms.   -    Increase  DULoxetine (CYMBALTA) 60 mg capsule; Take 1 Cap by mouth daily. Follow-up Disposition:  Return in about 6 months (around 6/27/2019) for HTN, gluc, anx  non-fasting lab one week prior  . lab results and schedule of future lab studies reviewed with patient  reviewed diet, exercise and weight control  cardiovascular risk and specific lipid/LDL goals reviewed  I have discussed the diagnosis, evaluation and treatment options and the intended plan with the patient. Patient understands and is in agreement. The patient has received an after-visit summary and questions were answered concerning future plans. I have discussed side effects and warnings of any new medications with the patient as well.

## 2019-02-22 ENCOUNTER — OFFICE VISIT (OUTPATIENT)
Dept: INTERNAL MEDICINE CLINIC | Facility: CLINIC | Age: 64
End: 2019-02-22

## 2019-02-22 VITALS
RESPIRATION RATE: 12 BRPM | OXYGEN SATURATION: 97 % | SYSTOLIC BLOOD PRESSURE: 120 MMHG | WEIGHT: 211 LBS | BODY MASS INDEX: 37.39 KG/M2 | HEART RATE: 94 BPM | TEMPERATURE: 97.8 F | HEIGHT: 63 IN | DIASTOLIC BLOOD PRESSURE: 75 MMHG

## 2019-02-22 DIAGNOSIS — E66.01 SEVERE OBESITY (BMI 35.0-35.9 WITH COMORBIDITY) (HCC): ICD-10-CM

## 2019-02-22 DIAGNOSIS — Z13.31 SCREENING FOR DEPRESSION: ICD-10-CM

## 2019-02-22 DIAGNOSIS — J01.10 ACUTE NON-RECURRENT FRONTAL SINUSITIS: Primary | ICD-10-CM

## 2019-02-22 RX ORDER — DOXYCYCLINE 100 MG/1
100 CAPSULE ORAL 2 TIMES DAILY
Qty: 14 CAP | Refills: 0 | Status: SHIPPED | OUTPATIENT
Start: 2019-02-22 | End: 2019-11-04

## 2019-02-22 RX ORDER — BENZONATATE 200 MG/1
200 CAPSULE ORAL
Qty: 21 CAP | Refills: 0 | Status: SHIPPED | OUTPATIENT
Start: 2019-02-22 | End: 2019-03-01

## 2019-02-22 NOTE — PATIENT INSTRUCTIONS
Doxycycline 100 mg twice a day for 7 days. Do not take calcium while on antibiotic. Tessalon Perles 200 mg every 8 hours for cough  Try Afrin nasal spray twice a day for 3 days only . Sinusitis: Care Instructions  Your Care Instructions    Sinusitis is an infection of the lining of the sinus cavities in your head. Sinusitis often follows a cold. It causes pain and pressure in your head and face. In most cases, sinusitis gets better on its own in 1 to 2 weeks. But some mild symptoms may last for several weeks. Sometimes antibiotics are needed. Follow-up care is a key part of your treatment and safety. Be sure to make and go to all appointments, and call your doctor if you are having problems. It's also a good idea to know your test results and keep a list of the medicines you take. How can you care for yourself at home? · Take an over-the-counter pain medicine, such as acetaminophen (Tylenol), ibuprofen (Advil, Motrin), or naproxen (Aleve). Read and follow all instructions on the label. · If the doctor prescribed antibiotics, take them as directed. Do not stop taking them just because you feel better. You need to take the full course of antibiotics. · Be careful when taking over-the-counter cold or flu medicines and Tylenol at the same time. Many of these medicines have acetaminophen, which is Tylenol. Read the labels to make sure that you are not taking more than the recommended dose. Too much acetaminophen (Tylenol) can be harmful. · Breathe warm, moist air from a steamy shower, a hot bath, or a sink filled with hot water. Avoid cold, dry air. Using a humidifier in your home may help. Follow the directions for cleaning the machine. · Use saline (saltwater) nasal washes to help keep your nasal passages open and wash out mucus and bacteria. You can buy saline nose drops at a grocery store or drugstore.  Or you can make your own at home by adding 1 teaspoon of salt and 1 teaspoon of baking soda to 2 cups of distilled water. If you make your own, fill a bulb syringe with the solution, insert the tip into your nostril, and squeeze gently. Lupie Lititz your nose. · Put a hot, wet towel or a warm gel pack on your face 3 or 4 times a day for 5 to 10 minutes each time. · Try a decongestant nasal spray like oxymetazoline (Afrin). Do not use it for more than 3 days in a row. Using it for more than 3 days can make your congestion worse. When should you call for help? Call your doctor now or seek immediate medical care if:    · You have new or worse swelling or redness in your face or around your eyes.     · You have a new or higher fever.    Watch closely for changes in your health, and be sure to contact your doctor if:    · You have new or worse facial pain.     · The mucus from your nose becomes thicker (like pus) or has new blood in it.     · You are not getting better as expected. Where can you learn more? Go to http://gomez-denise.info/. Enter D517 in the search box to learn more about \"Sinusitis: Care Instructions. \"  Current as of: March 27, 2018  Content Version: 11.9  © 9375-7102 [employer] [employer]. Care instructions adapted under license by [employer] (which disclaims liability or warranty for this information). If you have questions about a medical condition or this instruction, always ask your healthcare professional. [place] [address] any warranty or liability for your use of this information.

## 2019-02-22 NOTE — PROGRESS NOTES
4 [facility]  Identified pt with two pt identifiers(name and ). Chief Complaint   Patient presents with    Cough     congestion       Reviewed record In preparation for visit and have obtained necessary documentation. Has info on advanced directive but has not filled them out. 1. Have you been to the ER, urgent care clinic or hospitalized since your last visit? No     2. Have you seen or consulted any other health care providers outside of the [address] [place] [address] since your last visit? Include any pap smears or colon screening. Ortho    Vitals reviewed with provider. Health Maintenance reviewed:     Health Maintenance Due   Topic    Shingrix Vaccine Age 50> (1 of 2)    PAP AKA CERVICAL CYTOLOGY           Wt Readings from Last 3 Encounters:   19 211 lb (95.7 kg)   18 215 lb 8 oz (97.8 kg)   18 206 lb 12.8 oz (93.8 kg)        Temp Readings from Last 3 Encounters:   19 97.8 °F (36.6 °C) (Oral)   18 98 °F (36.7 °C) (Oral)   18 98.7 °F (37.1 °C) (Oral)        BP Readings from Last 3 Encounters:   19 120/75   18 134/69   18 140/82        Pulse Readings from Last 3 Encounters:   19 94   18 74   18 94        Vitals:    19 1253   BP: 120/75   Pulse: 94   Resp: 12   Temp: 97.8 °F (36.6 °C)   TempSrc: Oral   SpO2: 97%   Weight: 211 lb (95.7 kg)   Height: 5' 3\" (1.6 m)   PainSc:   0 - No pain          Learning Assessment:   :       Learning Assessment 2014   PRIMARY LEARNER Patient   PRIMARY LANGUAGE ENGLISH   LEARNER PREFERENCE PRIMARY DEMONSTRATION   ANSWERED BY patient   RELATIONSHIP SELF        Depression Screening:   :       3 most recent PHQ Screens 2019   Little interest or pleasure in doing things Not at all   Feeling down, depressed, irritable, or hopeless Several days   Total Score PHQ 2 1        Fall Risk Assessment:   :       Fall Risk Assessment, last 12 mths 2018   Able to walk?  Yes   Fall in past 12 months? No        Abuse Screening:   :       Abuse Screening Questionnaire 7/27/2018   Do you ever feel afraid of your partner? N   Are you in a relationship with someone who physically or mentally threatens you? N   Is it safe for you to go home?  Y        ADL Screening:   :       ADL Assessment 1/23/2015   Feeding yourself No Help Needed   Getting from bed to chair No Help Needed   Getting dressed No Help Needed   Bathing or showering No Help Needed   Walk across the room (includes cane/walker) No Help Needed   Using the telphone No Help Needed   Taking your medications No Help Needed   Preparing meals No Help Needed   Managing money (expenses/bills) No Help Needed   Moderately strenuous housework (laundry) No Help Needed   Shopping for personal items (toiletries/medicines) No Help Needed   Shopping for groceries No Help Needed   Driving No Help Needed   Climbing a flight of stairs No Help Needed   Getting to places beyond walking distances No Help Needed

## 2019-02-22 NOTE — PROGRESS NOTES
HISTORY OF PRESENT ILLNESS  Martina Nava is a 66 y.o. female. HPI  She complains of 10 days of cough. Initial symptom was sore throat, followed by sinus congestion, post nasal drainage. Cough is non-productive. Associated symptoms include bilateral ear pressure/pain, headache described as frontal pressure, post nasal drip and vertigo chills. She denies shortness of breath and wheezing fever. Clinical course has been unchanged since that time. She has tried Muciniex and ibuprofen with very little relief. She has asthma. She does not smoke.      3 most recent PHQ Screens 2/22/2019   Little interest or pleasure in doing things Not at all   Feeling down, depressed, irritable, or hopeless Several days   Total Score PHQ 2 1     Patient Active Problem List   Diagnosis Code    Asthma, mild intermittent J45.20    Hypertension, essential, benign I10    GERD (gastroesophageal reflux disease) K21.9    Hyperlipidemia, mixed E78.2    Glucose intolerance (impaired glucose tolerance) R73.02    Severe obesity (BMI 35.0-35.9 with comorbidity) ([facility]) E66.01, Z68.35    Primary osteoarthritis of right knee M17.11    Chronic bilateral low back pain without sciatica M54.5, G89.29    Vitamin D deficiency E55.9     Past Medical History:   Diagnosis Date    Abnormal Pap smear 3/22/2011    cinization 1996     Alopecia areata     Asthmatic bronchitis     Cyst, breast     breast cyst on L     Hypercholesterolemia     Hypertension      Past Surgical History:   Procedure Laterality Date    HX BREAST BIOPSY Left     benign surgical bx    HX GYN      BTL     Social History     Socioeconomic History    Marital status:      Spouse name: Not on file    Number of children: Not on file    Years of education: Not on file    Highest education level: Not on file   Tobacco Use    Smoking status: Former Smoker     Packs/day: 0.50     Years: 25.00     Pack years: 12.50     Last attempt to quit: 9/21/2015     Years since quitting: 3.4    Smokeless tobacco: Never Used   Substance and Sexual Activity    Alcohol use: Yes     Alcohol/week: 0.0 oz     Comment: occasional    Sexual activity: Yes     Partners: Male     Family History   Problem Relation Age of Onset    Heart Disease Mother     Diabetes Mother     Cancer Mother         breast    Stroke Mother     Breast Cancer Mother         late 74s    Hypertension Father     Diabetes Father    [facility] Dementia Father     Hypertension Sister     Hypertension Sister     Hypertension Brother     Elevated Lipids Brother     Elevated Lipids Brother     Diabetes Maternal Aunt         breast    Breast Cancer Maternal Aunt         possible 76s     Allergies   Allergen Reactions    Penicillins Hives    Sulfa (Sulfonamide Antibiotics) Other (comments)     Bactrim - rash     Current Outpatient Medications   Medication Sig Dispense Refill    DULoxetine (CYMBALTA) 60 mg capsule Take 1 Cap by mouth daily. 90 Cap 3    albuterol (PROVENTIL HFA, VENTOLIN HFA, PROAIR HFA) 90 mcg/actuation inhaler Take 1 Puff by inhalation every four (4) hours as needed for Wheezing. 1 Inhaler 0    lidocaine (LIDODERM) 5 % Apply 1 Patch to affected area.  ibuprofen (MOTRIN) 800 mg tablet Take 800 mg by mouth.  raNITIdine (ZANTAC) 300 mg tablet Take 1 Tab by mouth daily. 30 Tab 11    lisinopril-hydroCHLOROthiazide (PRINZIDE, ZESTORETIC) 10-12.5 mg per tablet take 1 tablet by mouth once daily 90 Tab 3    calcium carbonate (TUMS) 200 mg calcium (500 mg) chew Take 1 Tab by mouth daily. ROS  Visit Vitals  /75 (BP 1 Location: Left arm, BP Patient Position: Sitting)   Pulse 94   Temp 97.8 °F (36.6 °C) (Oral)   Resp 12   Ht 5' 3\" (1.6 m)   Wt 211 lb (95.7 kg)   SpO2 97%   BMI 37.38 kg/m²     Physical Exam   Constitutional: She is oriented to person, place, and time. She appears well-developed and well-nourished. HENT:   Head: Normocephalic and atraumatic.    Right Ear: Tympanic membrane and ear canal normal.   Left Ear: Tympanic membrane and ear canal normal.   Nose: No mucosal edema. Mouth/Throat: Oropharynx is clear and moist and mucous membranes are normal. Mucous membranes are not pale and not dry. No oropharyngeal exudate, posterior oropharyngeal edema or posterior oropharyngeal erythema. Eyes: Conjunctivae are normal. Pupils are equal, round, and reactive to light. Right eye exhibits no discharge. Left eye exhibits no discharge. Neck: Neck supple. Cardiovascular: Normal rate, regular rhythm, S1 normal, S2 normal and normal heart sounds. Exam reveals no gallop. No murmur heard. Pulmonary/Chest: Effort normal and breath sounds normal. She has no wheezes. She has no rhonchi. She has no rales. Lymphadenopathy:     She has no cervical adenopathy. Neurological: She is alert and oriented to person, place, and time. Skin: Skin is warm, dry and intact. No rash noted. Nursing note and vitals reviewed. ASSESSMENT and PLAN    ICD-10-CM ICD-9-CM    1. Acute non-recurrent frontal sinusitis J01.10 461.1 doxycycline (VIBRAMYCIN) 100 mg capsule   2. Screening for depression Z13.31 V79.0    3. Severe obesity (BMI 35.0-35.9 with comorbidity) ([facility]) E66.01 278.01     Z68.35 V85.35      Diagnoses and all orders for this visit:    1. Acute non-recurrent frontal sinusitis  -     doxycycline (VIBRAMYCIN) 100 mg capsule; Take 1 Cap by mouth two (2) times a day. 2. Screening for depression-Negative    3. Severe obesity (BMI 35.0-35.9 with comorbidity) ([facility] [address]  Discussed using smaller plate for portion control, keeping a food diary for awareness of food consumed, checking weight often, and increasing physical activity. Will re-evaluate next visit. Other orders  -     benzonatate (TESSALON) 200 mg capsule; Take 1 Cap by mouth three (3) times daily as needed for Cough for up to 7 days. Follow-up Disposition:  Return if symptoms worsen or fail to improve.   reviewed diet, exercise and weight control  I have discussed the diagnosis, evaluation and treatment options and the intended plan with the patient. Patient understands and is in agreement. The patient has received an after-visit summary and questions were answered concerning future plans. I have discussed side effects and warnings of any new medications with the patient as well.

## 2019-10-31 ENCOUNTER — TELEPHONE (OUTPATIENT)
Dept: INTERNAL MEDICINE CLINIC | Facility: CLINIC | Age: 64
End: 2019-10-31

## 2019-10-31 NOTE — TELEPHONE ENCOUNTER
Pt would like the nurse to call her at her earliest convince because she is trying to be seen for a routine and there is nothing available until Dec. 10th. Pt stated that is crazy and she can not wait that long and would like to speak with the nurse about this.
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
## 2019-11-04 ENCOUNTER — OFFICE VISIT (OUTPATIENT)
Dept: INTERNAL MEDICINE CLINIC | Facility: CLINIC | Age: 64
End: 2019-11-04

## 2019-11-04 VITALS
OXYGEN SATURATION: 96 % | TEMPERATURE: 97.9 F | BODY MASS INDEX: 38.54 KG/M2 | WEIGHT: 217.5 LBS | RESPIRATION RATE: 12 BRPM | HEIGHT: 63 IN | HEART RATE: 88 BPM | SYSTOLIC BLOOD PRESSURE: 130 MMHG | DIASTOLIC BLOOD PRESSURE: 84 MMHG

## 2019-11-04 DIAGNOSIS — F41.9 ANXIETY: ICD-10-CM

## 2019-11-04 DIAGNOSIS — S33.5XXS SPRAIN LUMBAR REGION, SEQUELA: Primary | ICD-10-CM

## 2019-11-04 DIAGNOSIS — S13.9XXA SPRAIN OF CERVICAL NECK, INITIAL ENCOUNTER: ICD-10-CM

## 2019-11-04 RX ORDER — DULOXETIN HYDROCHLORIDE 60 MG/1
60 CAPSULE, DELAYED RELEASE ORAL DAILY
Qty: 90 CAP | Refills: 2 | Status: SHIPPED | OUTPATIENT
Start: 2019-11-04 | End: 2021-02-08 | Stop reason: SDUPTHER

## 2019-11-04 RX ORDER — DULOXETIN HYDROCHLORIDE 30 MG/1
30 CAPSULE, DELAYED RELEASE ORAL DAILY
Qty: 14 CAP | Refills: 0 | Status: SHIPPED | OUTPATIENT
Start: 2019-11-04 | End: 2019-11-18

## 2019-11-04 RX ORDER — CYCLOBENZAPRINE HCL 5 MG
5 TABLET ORAL
Qty: 30 TAB | Refills: 1 | Status: SHIPPED | OUTPATIENT
Start: 2019-11-04 | End: 2020-12-08

## 2019-11-04 RX ORDER — TRAMADOL HYDROCHLORIDE 50 MG/1
TABLET ORAL
Refills: 0 | COMMUNITY
Start: 2019-10-30 | End: 2019-11-04

## 2019-11-04 NOTE — PATIENT INSTRUCTIONS
[facility]   [address] [facility], [address]   Tel: [phone]      [facility]   [address] [facility] [facility], [address]           Tel: [phone]            Try OTC pain patch such as Eleazar Hove  May try cyclobenzaprine 5 mg every 8 hours (muscle relaxer).    Make appointment with Physical Therapy
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
## 2019-11-12 ENCOUNTER — HOSPITAL ENCOUNTER (OUTPATIENT)
Dept: MAMMOGRAPHY | Age: 64
Discharge: HOME | End: 2019-11-12
Attending: INTERNAL MEDICINE
Payer: COMMERCIAL

## 2019-11-12 DIAGNOSIS — Z12.31 VISIT FOR SCREENING MAMMOGRAM: ICD-10-CM

## 2019-11-12 PROCEDURE — 77067 SCR MAMMO BI INCL CAD: CPT

## 2019-11-20 ENCOUNTER — HOSPITAL ENCOUNTER (OUTPATIENT)
Dept: MAMMOGRAPHY | Age: 64
Discharge: HOME | End: 2019-11-20
Attending: INTERNAL MEDICINE
Payer: COMMERCIAL

## 2019-11-20 DIAGNOSIS — R92.8 ABNORMAL MAMMOGRAM: ICD-10-CM

## 2019-11-20 PROCEDURE — 77061 BREAST TOMOSYNTHESIS UNI: CPT

## 2019-11-20 PROCEDURE — 76642 ULTRASOUND BREAST LIMITED: CPT

## 2019-12-10 ENCOUNTER — OFFICE VISIT (OUTPATIENT)
Dept: INTERNAL MEDICINE CLINIC | Facility: CLINIC | Age: 64
End: 2019-12-10

## 2019-12-10 VITALS
HEIGHT: 63 IN | BODY MASS INDEX: 38.27 KG/M2 | OXYGEN SATURATION: 96 % | SYSTOLIC BLOOD PRESSURE: 125 MMHG | DIASTOLIC BLOOD PRESSURE: 73 MMHG | HEART RATE: 70 BPM | RESPIRATION RATE: 12 BRPM | WEIGHT: 216 LBS | TEMPERATURE: 98.3 F

## 2019-12-10 DIAGNOSIS — E78.2 HYPERLIPIDEMIA, MIXED: ICD-10-CM

## 2019-12-10 DIAGNOSIS — I10 HYPERTENSION, ESSENTIAL, BENIGN: Primary | ICD-10-CM

## 2019-12-10 DIAGNOSIS — R73.02 GLUCOSE INTOLERANCE (IMPAIRED GLUCOSE TOLERANCE): ICD-10-CM

## 2019-12-10 DIAGNOSIS — J45.20 MILD INTERMITTENT ASTHMA WITHOUT COMPLICATION: ICD-10-CM

## 2019-12-10 DIAGNOSIS — K21.9 GASTROESOPHAGEAL REFLUX DISEASE WITHOUT ESOPHAGITIS: ICD-10-CM

## 2019-12-10 NOTE — PROGRESS NOTES
HISTORY OF PRESENT ILLNESS  Thu Rodriguez is a 60 y.o. female. HPI  She presents for follow up of hypertension, hyperlipidemia, obesity and glucose into[place]. Diet and Lifestyle: generally follows a low fat low cholesterol diet, generally follows a low sodium diet, follows a diabetic diet regularly, sedentary, nonsmoker  Medication compliance: compliant all of the time  Medication side effects: none  Home BP Monitorin's/70's  Cardiovascular ROS:  She complains of claudication. left leg Neurogenic    She denies palpitations, orthopnea, exertional chest pressure/discomfort, lower extremity edema, dyspnea on exertion, dizziness        She presents for follow up of anxiety. Current therapy includes: duloxetine. With therapy symptoms are imroved  Patient denies: fear of impending doom, feelings of apprehension/worry, poor concentration/attention and racing thoughts   Reported side effects from the treatment: none. She is being seen for follow up of asthma. She is taking medications as instructed, no medication side effects noted, no significant ongoing wheezing or shortness of breath, using bronchodilator MDI less than twice a week. Uses rescue inhaler or nebulizer:0  times /week    She presents for follow up of gastroesophageal reflux. Current symptoms include heartburn with acidic foods. She denies dysphagia. Still going to therapy for neck and back. It is very slowly getting better. Taking Motrin which she feels she cannot do without at this time.      Patient Active Problem List   Diagnosis Code    Asthma, mild intermittent J45.20    Hypertension, essential, benign I10    GERD (gastroesophageal reflux disease) K21.9    Hyperlipidemia, mixed E78.2    Glucose intolerance (impaired glucose tolerance) R73.02    Severe obesity (BMI 35.0-35.9 with comorbidity) ([facility]) E66.01, Z68.35    Primary osteoarthritis of right knee M17.11    Chronic bilateral low back pain without sciatica M54.5, G89.29    Vitamin D deficiency E55.9    Abnormal mammogram of left breast R92.8     Past Medical History:   Diagnosis Date    Abnormal Pap smear 3/22/2011    cinization 1996     Alopecia areata     Asthmatic bronchitis     Cyst, breast     breast cyst on L     Hypercholesterolemia     Hypertension      Past Surgical History:   Procedure Laterality Date    HX BREAST BIOPSY Left     benign surgical bx    HX GYN      BTL     Social History     Socioeconomic History    Marital status:      Spouse name: Not on file    Number of children: Not on file    Years of education: Not on file    Highest education level: Not on file   Tobacco Use    Smoking status: Former Smoker     Packs/day: 0.50     Years: 25.00     Pack years: 12.50     Last attempt to quit: 2015     Years since quittin.2    Smokeless tobacco: Never Used   Substance and Sexual Activity    Alcohol use: Yes     Alcohol/week: 0.0 standard drinks     Comment: occasional    Sexual activity: Yes     Partners: Male     Family History   Problem Relation Age of Onset    Heart Disease Mother     Diabetes Mother     Cancer Mother         breast    Stroke Mother     Breast Cancer Mother         late 74s    Hypertension Father     Diabetes Father    [facility] Dementia Father     Hypertension Sister     Hypertension Sister     Hypertension Brother     Elevated Lipids Brother     Elevated Lipids Brother     Diabetes Maternal Aunt         breast    Breast Cancer Maternal Aunt         possible 76s     Allergies   Allergen Reactions    Penicillins Hives    Sulfa (Sulfonamide Antibiotics) Other (comments)     Bactrim - rash     Current Outpatient Medications   Medication Sig Dispense Refill    lisinopril-hydroCHLOROthiazide (PRINZIDE, ZESTORETIC) 10-12.5 mg per tablet Take 1 Tab by mouth daily. 90 Tab 1    raNITIdine (ZANTAC) 300 mg tab Take 1 Tab by mouth nightly.  30 Tab 5    cyclobenzaprine (FLEXERIL) 5 mg tablet Take 1 Tab by mouth three (3) times daily as needed for Muscle Spasm(s). 30 Tab 1    DULoxetine (CYMBALTA) 60 mg capsule Take 1 Cap by mouth daily. Start ater finishing 30 mg capsules. 90 Cap 2    albuterol (PROVENTIL HFA, VENTOLIN HFA, PROAIR HFA) 90 mcg/actuation inhaler Take 1 Puff by inhalation every four (4) hours as needed for Wheezing. 1 Inhaler 0    ibuprofen (MOTRIN) 800 mg tablet Take 800 mg by mouth.  calcium carbonate (TUMS) 200 mg calcium (500 mg) chew Take 1 Tab by mouth daily. Review of Systems   Constitutional: Negative for malaise/fatigue and weight loss. Gastrointestinal: Negative for constipation and diarrhea. Musculoskeletal: Positive for back pain, joint pain (right knee) and neck pain. Neurological: Negative for tingling (left leg) and focal weakness (left leg). Visit Vitals  /73 (BP 1 Location: Left arm, BP Patient Position: Sitting)   Pulse 70   Temp 98.3 °F (36.8 °C) (Oral)   Resp 12   Ht 5' 3\" (1.6 m)   Wt 216 lb (98 kg)   SpO2 96%   BMI 38.26 kg/m²     Physical Exam  Vitals signs and nursing note reviewed. Constitutional:       Appearance: Normal appearance. She is well-developed. HENT:      Head: Normocephalic and atraumatic. Eyes:      Conjunctiva/sclera: Conjunctivae normal.      Pupils: Pupils are equal, round, and reactive to light. Neck:      Musculoskeletal: Neck supple. Thyroid: No thyromegaly. Vascular: No carotid bruit. Cardiovascular:      Rate and Rhythm: Normal rate and regular rhythm. Chest Wall: PMI is not displaced. Pulses:           Dorsalis pedis pulses are 2+ on the right side and 2+ on the left side. Posterior tibial pulses are 2+ on the right side and 2+ on the left side. Heart sounds: Normal heart sounds. No murmur. No gallop. Pulmonary:      Effort: Pulmonary effort is normal.      Breath sounds: No wheezing, rhonchi or rales.    Abdominal:      General: Bowel sounds are normal. There is no distension or abdominal bruit. Palpations: Abdomen is soft. There is no hepatomegaly, splenomegaly or mass. Tenderness: There is no tenderness. Musculoskeletal:      Right lower leg: No edema. Left lower leg: No edema. Lymphadenopathy:      Cervical: No cervical adenopathy. Upper Body:      Right upper body: No supraclavicular adenopathy. Left upper body: No supraclavicular adenopathy. Skin:     General: Skin is warm and dry. Findings: No rash. Neurological:      Mental Status: She is alert and oriented to person, place, and time. Sensory: No sensory deficit. Motor: Motor function is intact. Gait: Gait is intact. Psychiatric:         Attention and Perception: Attention normal.         Mood and Affect: Mood normal.         Speech: Speech normal.         Behavior: Behavior normal.       Results for orders placed or performed in visit on 12/20/18   HEMOGLOBIN A1C WITH EAG   Result Value Ref Range    Hemoglobin A1c 5.8 (H) 4.8 - 5.6 %    Estimated average glucose 120 mg/dL   LIPID PANEL   Result Value Ref Range    Cholesterol, total 183 100 - 199 mg/dL    Triglyceride 99 0 - 149 mg/dL    HDL Cholesterol 43 >39 mg/dL    VLDL, calculated 20 5 - 40 mg/dL    LDL, calculated 120 (H) 0 - 99 mg/dL     Lab Results   Component Value Date/Time    Sodium 143 04/26/2018 09:10 AM    Potassium 4.5 04/26/2018 09:10 AM    Chloride 101 04/26/2018 09:10 AM    CO2 26 04/26/2018 09:10 AM    Glucose 95 04/26/2018 09:10 AM    BUN 13 04/26/2018 09:10 AM    Creatinine 0.75 04/26/2018 09:10 AM    BUN/Creatinine ratio 17 04/26/2018 09:10 AM    GFR est  04/26/2018 09:10 AM    GFR est non-AA 87 04/26/2018 09:10 AM    Calcium 9.5 04/26/2018 09:10 AM    Bilirubin, total 0.6 04/26/2018 09:10 AM    AST (SGOT) 13 04/26/2018 09:10 AM    Alk.  phosphatase 85 04/26/2018 09:10 AM    Protein, total 6.9 04/26/2018 09:10 AM    Albumin 3.9 04/26/2018 09:10 AM    A-G Ratio 1.3 04/26/2018 09:10 AM    ALT (SGPT) 13 04/26/2018 09:10 AM       ASSESSMENT and PLAN    ICD-10-CM ICD-9-CM    1. Hypertension, essential, benign G33 652.0 METABOLIC PANEL, BASIC   2. Hyperlipidemia, mixed E78.2 272.2    3. Glucose intolerance (impaired glucose tolerance) R73.02 790.22 HEMOGLOBIN A1C WITH EAG   4. Mild intermittent asthma without complication N76.57 026.72    5. Gastroesophageal reflux disease without esophagitis K21.9 530.81      Diagnoses and all orders for this visit:    1. Hypertension, essential, benign  Hypertension is controlled. -     METABOLIC PANEL, BASIC; Future    2. Hyperlipidemia, mixed  Hyperlipidemia is borderline controlled. She declines cholesterol lowering medication. 3. Glucose intolerance (impaired glucose tolerance)  A1c is stable  -     HEMOGLOBIN A1C WITH EAG; Future    4. Mild intermittent asthma without complication  Stable with minimal albuterol use     5. Gastroesophageal reflux disease without esophagitis  Symptoms controlled as long as she avoids offending foods. Follow-up and Dispositions    · Return in about 6 months (around 6/10/2020) for HTN, chol, gluc  NON-fasting lab one week prior  . lab results and schedule of future lab studies reviewed with patient  reviewed diet, exercise and weight control  I have discussed the diagnosis, evaluation and treatment options and the intended plan with the patient. Patient understands and is in agreement. The patient has received an after-visit summary and questions were answered concerning future plans. I have discussed side effects and warnings of any new medications with the patient as well.

## 2019-12-10 NOTE — PROGRESS NOTES
[facility]  Identified pt with two pt identifiers(name and ). Chief Complaint   Patient presents with    Hypertension    Cholesterol Problem    Blood sugar problem    Asthma       Reviewed record In preparation for visit and have obtained necessary documentation. Has info on advanced directive but has not filled them out. 1. Have you been to the ER, urgent care clinic or hospitalized since your last visit? No     2. Have you seen or consulted any other health care providers outside of the [address] [place] [address] since your last visit? Include any pap smears or colon screening. Mammogram, PT, US    Vitals reviewed with provider. Health Maintenance reviewed:     Health Maintenance Due   Topic    PAP AKA CERVICAL CYTOLOGY         Wt Readings from Last 3 Encounters:   19 217 lb 8 oz (98.7 kg)   19 211 lb (95.7 kg)   18 215 lb 8 oz (97.8 kg)      Temp Readings from Last 3 Encounters:   19 97.9 °F (36.6 °C) (Oral)   19 97.8 °F (36.6 °C) (Oral)   18 98 °F (36.7 °C) (Oral)      BP Readings from Last 3 Encounters:   19 130/84   19 120/75   18 134/69      Pulse Readings from Last 3 Encounters:   19 88   19 94   18 74      There were no vitals filed for this visit. Learning Assessment:   :     Learning Assessment 2014   PRIMARY LEARNER Patient   PRIMARY LANGUAGE ENGLISH   LEARNER PREFERENCE PRIMARY DEMONSTRATION   ANSWERED BY patient   RELATIONSHIP SELF        Depression Screening:   :     3 most recent PHQ Screens 2019   Little interest or pleasure in doing things Not at all   Feeling down, depressed, irritable, or hopeless Several days   Total Score PHQ 2 1        Fall Risk Assessment:   :     Fall Risk Assessment, last 12 mths 2018   Able to walk? Yes   Fall in past 12 months? No        Abuse Screening:   :     Abuse Screening Questionnaire 2019   Do you ever feel afraid of your partner?  Kary Parekh Are you in a relationship with someone who physically or mentally threatens you? N N   Is it safe for you to go home?  Y Y        ADL Screening:   :     ADL Assessment 1/23/2015   Feeding yourself No Help Needed   Getting from bed to chair No Help Needed   Getting dressed No Help Needed   Bathing or showering No Help Needed   Walk across the room (includes cane/walker) No Help Needed   Using the telphone No Help Needed   Taking your medications No Help Needed   Preparing meals No Help Needed   Managing money (expenses/bills) No Help Needed   Moderately strenuous housework (laundry) No Help Needed   Shopping for personal items (toiletries/medicines) No Help Needed   Shopping for groceries No Help Needed   Driving No Help Needed   Climbing a flight of stairs No Help Needed   Getting to places beyond walking distances No Help Needed

## 2020-03-19 ENCOUNTER — OFFICE VISIT (OUTPATIENT)
Dept: INTERNAL MEDICINE CLINIC | Facility: CLINIC | Age: 65
End: 2020-03-19

## 2020-03-19 VITALS
RESPIRATION RATE: 12 BRPM | WEIGHT: 216 LBS | TEMPERATURE: 98.6 F | BODY MASS INDEX: 38.27 KG/M2 | DIASTOLIC BLOOD PRESSURE: 84 MMHG | SYSTOLIC BLOOD PRESSURE: 129 MMHG | HEART RATE: 73 BPM | HEIGHT: 63 IN | OXYGEN SATURATION: 95 %

## 2020-03-19 DIAGNOSIS — S13.9XXD NECK SPRAIN, SUBSEQUENT ENCOUNTER: Primary | ICD-10-CM

## 2020-03-19 DIAGNOSIS — Z13.31 SCREENING FOR DEPRESSION: ICD-10-CM

## 2020-03-19 RX ORDER — GABAPENTIN 300 MG/1
300 CAPSULE ORAL 3 TIMES DAILY
Qty: 90 CAP | Refills: 5 | Status: SHIPPED | OUTPATIENT
Start: 2020-03-19 | End: 2020-04-08 | Stop reason: SINTOL
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
## 2020-03-19 NOTE — PATIENT INSTRUCTIONS
Start gabapentin 300 mg at bedtime for 5 days, then increase to twice a day for 5 days and then take 3 times a day   Call us in 3-4 weeks and let us know if is helping. Plan for 3-6 months of treatment with this medication .

## 2020-03-19 NOTE — PROGRESS NOTES
[address] [place] [address]  Identified pt with two pt identifiers(name and ). Chief Complaint   Patient presents with    Neck Pain       Reviewed record In preparation for visit and have obtained necessary documentation. Has info on advanced directive but has not filled them out. 1. Have you been to the ER, urgent care clinic or hospitalized since your last visit? No     2. Have you seen or consulted any other health care providers outside of the [address] [place] [address] since your last visit? Include any pap smears or colon screening. PT    Vitals reviewed with provider. Health Maintenance reviewed:     Health Maintenance Due   Topic    PAP AKA CERVICAL CYTOLOGY           Wt Readings from Last 3 Encounters:   20 216 lb (98 kg)   12/10/19 216 lb (98 kg)   19 217 lb 8 oz (98.7 kg)        Temp Readings from Last 3 Encounters:   20 98.6 °F (37 °C) (Oral)   12/10/19 98.3 °F (36.8 °C) (Oral)   19 97.9 °F (36.6 °C) (Oral)        BP Readings from Last 3 Encounters:   20 129/84   12/10/19 125/73   19 130/84        Pulse Readings from Last 3 Encounters:   20 73   12/10/19 70   19 88        Vitals:    20 1319   BP: 129/84   Pulse: 73   Resp: 12   Temp: 98.6 °F (37 °C)   TempSrc: Oral   SpO2: 95%   Weight: 216 lb (98 kg)   Height: 5' 3\" (1.6 m)   PainSc:   6   PainLoc: Neck          Learning Assessment:   :       Learning Assessment 2014   PRIMARY LEARNER Patient   PRIMARY LANGUAGE ENGLISH   LEARNER PREFERENCE PRIMARY DEMONSTRATION   ANSWERED BY patient   RELATIONSHIP SELF        Depression Screening:   :       3 most recent PHQ Screens 3/19/2020   Little interest or pleasure in doing things Not at all   Feeling down, depressed, irritable, or hopeless Not at all   Total Score PHQ 2 0        Fall Risk Assessment:   :       Fall Risk Assessment, last 12 mths 2018   Able to walk? Yes   Fall in past 12 months?  No        Abuse Screening:   :       Abuse Screening Questionnaire 11/4/2019 7/27/2018   Do you ever feel afraid of your partner? N N   Are you in a relationship with someone who physically or mentally threatens you? N N   Is it safe for you to go home?  Y Y        ADL Screening:   :       ADL Assessment 1/23/2015   Feeding yourself No Help Needed   Getting from bed to chair No Help Needed   Getting dressed No Help Needed   Bathing or showering No Help Needed   Walk across the room (includes cane/walker) No Help Needed   Using the telphone No Help Needed   Taking your medications No Help Needed   Preparing meals No Help Needed   Managing money (expenses/bills) No Help Needed   Moderately strenuous housework (laundry) No Help Needed   Shopping for personal items (toiletries/medicines) No Help Needed   Shopping for groceries No Help Needed   Driving No Help Needed   Climbing a flight of stairs No Help Needed   Getting to places beyond walking distances No Help Needed

## 2020-03-30 RX ORDER — LISINOPRIL AND HYDROCHLOROTHIAZIDE 10; 12.5 MG/1; MG/1
TABLET ORAL
Qty: 90 TAB | Refills: 1 | Status: SHIPPED | OUTPATIENT
Start: 2020-03-30 | End: 2020-10-05

## 2020-04-08 ENCOUNTER — TELEPHONE (OUTPATIENT)
Dept: INTERNAL MEDICINE CLINIC | Facility: CLINIC | Age: 65
End: 2020-04-08

## 2020-04-08 DIAGNOSIS — M54.2 NECK PAIN: Primary | ICD-10-CM

## 2020-04-08 RX ORDER — GABAPENTIN 100 MG/1
CAPSULE ORAL
Qty: 120 CAP | Refills: 5 | Status: SHIPPED | OUTPATIENT
Start: 2020-04-08 | End: 2020-08-13 | Stop reason: SINTOL

## 2020-04-08 NOTE — TELEPHONE ENCOUNTER
I recommend trying a lower dose. I will send 100 mg capsules. Start at three times a day. If tolerated and that dose does not help pain, try 200 mg three time a day, but increase only one dose at a time every 3-5 days. If not tolerated or helpful at lower dose, set up a virtual encounter.  Prescription sent to pharmacy

## 2020-04-08 NOTE — TELEPHONE ENCOUNTER
Verified patients name and date of birth. Patient states gabapentin is helping with pain, but she has \"pretty bad\" stomach cramps when she takes it and it makes her not want to take the medication. Patient is asking about a possible alternative. Uses [facility] in [address].

## 2020-04-08 NOTE — TELEPHONE ENCOUNTER
Patient called to say the medication she is taking gabapentin does make her stomach queasy but it does seem to be helping. She said if you need to call to discuss further please adeola [phone].

## 2020-04-09 NOTE — TELEPHONE ENCOUNTER
Verified patients name and date of birth. Advised patient per Dr Malena Hamlin. Patient stated understanding.

## 2020-06-09 ENCOUNTER — HOSPITAL ENCOUNTER (OUTPATIENT)
Dept: CT IMAGING | Age: 65
End: 2020-06-09
Payer: COMMERCIAL

## 2020-06-09 ENCOUNTER — HOSPITAL ENCOUNTER (OUTPATIENT)
Dept: CT IMAGING | Age: 65
Discharge: HOME | End: 2020-06-09
Payer: COMMERCIAL

## 2020-06-09 DIAGNOSIS — M47.812 SPONDYLOSIS WITHOUT MYELOPATHY OR RADICULOPATHY, CERVICAL REGION: ICD-10-CM

## 2020-06-09 DIAGNOSIS — M51.26 OTHER INTERVERTEBRAL DISC DISPLACEMENT, LUMBAR REGION: ICD-10-CM

## 2020-06-09 PROCEDURE — 72125 CT NECK SPINE W/O DYE: CPT

## 2020-06-09 PROCEDURE — 72131 CT LUMBAR SPINE W/O DYE: CPT

## 2020-08-13 ENCOUNTER — OFFICE VISIT (OUTPATIENT)
Dept: INTERNAL MEDICINE CLINIC | Age: 65
End: 2020-08-13
Payer: COMMERCIAL

## 2020-08-13 VITALS
SYSTOLIC BLOOD PRESSURE: 130 MMHG | DIASTOLIC BLOOD PRESSURE: 85 MMHG | HEART RATE: 85 BPM | RESPIRATION RATE: 16 BRPM | WEIGHT: 217 LBS | HEIGHT: 63 IN | BODY MASS INDEX: 38.45 KG/M2 | OXYGEN SATURATION: 96 %

## 2020-08-13 DIAGNOSIS — M54.50 CHRONIC BILATERAL LOW BACK PAIN WITHOUT SCIATICA: ICD-10-CM

## 2020-08-13 DIAGNOSIS — I10 HYPERTENSION, ESSENTIAL, BENIGN: ICD-10-CM

## 2020-08-13 DIAGNOSIS — R73.02 GLUCOSE INTOLERANCE (IMPAIRED GLUCOSE TOLERANCE): ICD-10-CM

## 2020-08-13 DIAGNOSIS — E78.2 HYPERLIPIDEMIA, MIXED: ICD-10-CM

## 2020-08-13 DIAGNOSIS — G89.29 CHRONIC BILATERAL LOW BACK PAIN WITHOUT SCIATICA: ICD-10-CM

## 2020-08-13 DIAGNOSIS — J45.20 MILD INTERMITTENT ASTHMA WITHOUT COMPLICATION: Primary | ICD-10-CM

## 2020-08-13 LAB — HBA1C MFR BLD HPLC: 6 %

## 2020-08-13 PROCEDURE — 83036 HEMOGLOBIN GLYCOSYLATED A1C: CPT | Performed by: INTERNAL MEDICINE

## 2020-08-13 PROCEDURE — 99214 OFFICE O/P EST MOD 30 MIN: CPT | Performed by: INTERNAL MEDICINE

## 2020-08-13 RX ORDER — PREGABALIN 75 MG/1
75 CAPSULE ORAL DAILY
COMMUNITY
End: 2022-11-01
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
## 2020-08-13 NOTE — PROGRESS NOTES
Jasper Jones is a 60 y.o. female  Chief Complaint   Patient presents with    Follow-up           1. Have you been to the ER, urgent care clinic since your last visit? no Hospitalized since your last visit?no    2. Have you seen or consulted any other health care providers outside of the [address] [place] [address] since your last visit?no  Include any pap smears or colon screening.  No  Health Maintenance   Topic Date Due    PAP AKA CERVICAL CYTOLOGY  10/30/2016    Influenza Age 5 to Adult  08/01/2020    Pneumococcal 0-64 years (1 of 1 - PPSV23) 11/04/2020 (Originally 10/24/1961)    Shingrix Vaccine Age 50> (1 of 2) 02/01/2021 (Originally 10/24/2007)    Breast Cancer Screen Mammogram  11/20/2020    Lipid Screen  12/20/2023    Colonoscopy  11/25/2024    DTaP/Tdap/Td series (2 - Td) 01/23/2025    Hepatitis C Screening  Completed     Visit Vitals  /85 (BP 1 Location: Right arm, BP Patient Position: At rest)   Pulse 85   Resp 16   Wt 217 lb (98.4 kg)   SpO2 96%   BMI 38.44 kg/m²

## 2020-10-05 RX ORDER — LISINOPRIL AND HYDROCHLOROTHIAZIDE 10; 12.5 MG/1; MG/1
TABLET ORAL
Qty: 90 TAB | Refills: 1 | Status: SHIPPED | OUTPATIENT
Start: 2020-10-05 | End: 2021-04-05 | Stop reason: SDUPTHER

## 2020-12-08 ENCOUNTER — OFFICE VISIT (OUTPATIENT)
Dept: NEUROLOGY | Age: 65
End: 2020-12-08
Payer: COMMERCIAL

## 2020-12-08 VITALS
SYSTOLIC BLOOD PRESSURE: 132 MMHG | RESPIRATION RATE: 18 BRPM | DIASTOLIC BLOOD PRESSURE: 84 MMHG | OXYGEN SATURATION: 98 % | HEART RATE: 82 BPM

## 2020-12-08 DIAGNOSIS — M54.10 RADICULAR PAIN OF LEFT LOWER EXTREMITY: ICD-10-CM

## 2020-12-08 DIAGNOSIS — R20.2 PARESTHESIA OF LEFT LEG: Primary | ICD-10-CM

## 2020-12-08 PROCEDURE — 99204 OFFICE O/P NEW MOD 45 MIN: CPT | Performed by: PSYCHIATRY & NEUROLOGY
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
## 2020-12-10 ENCOUNTER — TELEPHONE (OUTPATIENT)
Dept: NEUROLOGY | Age: 65
End: 2020-12-10

## 2020-12-11 ENCOUNTER — TELEPHONE (OUTPATIENT)
Dept: NEUROLOGY | Age: 65
End: 2020-12-11

## 2020-12-14 ENCOUNTER — TELEPHONE (OUTPATIENT)
Dept: NEUROLOGY | Age: 65
End: 2020-12-14

## 2020-12-18 ENCOUNTER — OFFICE VISIT (OUTPATIENT)
Dept: NEUROLOGY | Age: 65
End: 2020-12-18
Payer: COMMERCIAL

## 2020-12-18 VITALS
OXYGEN SATURATION: 98 % | SYSTOLIC BLOOD PRESSURE: 130 MMHG | BODY MASS INDEX: 38.45 KG/M2 | RESPIRATION RATE: 16 BRPM | HEIGHT: 63 IN | WEIGHT: 217 LBS | HEART RATE: 76 BPM | DIASTOLIC BLOOD PRESSURE: 80 MMHG

## 2020-12-18 DIAGNOSIS — R20.2 PARESTHESIA OF LEFT LEG: ICD-10-CM

## 2020-12-18 DIAGNOSIS — M54.10 RADICULAR PAIN OF LEFT LOWER EXTREMITY: Primary | ICD-10-CM

## 2020-12-18 PROCEDURE — 95886 MUSC TEST DONE W/N TEST COMP: CPT | Performed by: PSYCHIATRY & NEUROLOGY

## 2020-12-18 PROCEDURE — 95910 NRV CNDJ TEST 7-8 STUDIES: CPT | Performed by: PSYCHIATRY & NEUROLOGY

## 2020-12-18 NOTE — PROGRESS NOTES
[address] [facility] Neurology [facility]  [address]  Phone [phone] Fax [phone]  Test Date:  2020    Patient: Jana Askew : 1955 Physician: Chris Quintana DO   Sex: Female Height: 5' 3\" Ref Phys: Chris Quintana DO   ID#: [id number] Weight: 217 lbs. Technician: Tate Mohr     Patient Complaints:  Left lower extremity radicular pain/paresthesias    NCV & EMG Findings:  All nerve conduction studies were within normal limits. All F Wave latencies were within normal limits. All examined muscles (as indicated in the following table) showed no evidence of electrical instability. Impression:  Extensive electrodiagnostic examination of the left lower extremity reveals no abnormalities. In particular, there is no evidence of a left lumbosacral motor radiculopathy. In addition, there is no evidence of a generalized sensorimotor polyneuropathy. ___________________________  Fly Altaf Quintana,         Nerve Conduction Studies  Anti Sensory Summary Table     Stim Site NR Peak (ms) Norm Peak (ms) P-T Amp (µV) Norm P-T Amp Onset (ms) Site1 Site2 Delta-P (ms) Dist (cm) Mukund (m/s) Norm Mukund (m/s)   Left Sup Peroneal Anti Sensory (Ant Lat Mall)  31.2°C   14 cm    2.9 <4.4 15.5 >5.0 2.3 14 cm Ant Lat Mall 2.9 14.0 48 >32   Left Sural Anti Sensory (Lat Mall)  30.6°C   Calf    3.1 <4.0 6.9 >5.0 2.7 Calf Lat Mall 3.1 14.0 45 >35   Site 2    3.2  5.5  2.5           Motor Summary Table     Stim Site NR Onset (ms) Norm Onset (ms) O-P Amp (mV) Norm O-P Amp Site1 Site2 Delta-0 (ms) Dist (cm) Mukund (m/s) Norm Mukund (m/s)   Left Peroneal Motor (Ext Dig Brev)  30.5°C   Ankle    3.6 <6.1 4.3 >2.5 B Fib Ankle 6.1 33.0 54 >38   B Fib    9.7  3.2  Poplt B Fib 1.6 10.0 62 >40   Poplt    11.3  3.2          Left Tibial Motor (Abd Loza Brev)  30.8°C   Ankle    2.5 <6.1 10.7 >3.0 Knee Ankle 8.4 40.0 48 >35   Knee 10.9  9.1            F Wave Studies     NR F-Lat (ms) Lat Norm (ms) L-R F-Lat (ms) L-R Lat Norm   Left Tibial (Mrkrs) (Abd Hallucis)  30.9°C      47.42 <61  <5.7     EMG     Side Muscle Nerve Root Ins Act Fibs Psw Amp Dur Poly Recrt Int Pat Comment   Left Ext Dig Brev Dp Br Peronel L5, S1 Nml Nml Nml Nml Nml 0 Nml Nml    Left AbdHallucis MedPlantar S1-2 Nml Nml Nml Nml Nml 0 Nml Nml    Left PostTibialis Tibial L5, S1 Nml Nml Nml Nml Nml 0 Nml Nml    Left Gastroc Tibial S1-2 Nml Nml Nml Nml Nml 0 Nml Nml    Left AntTibialis Dp Br Peronel L4-5 Nml Nml Nml Nml Nml 0 Nml Nml    Left RectFemoris Femoral L2-4 Nml Nml Nml Nml Nml 0 Nml Nml          Waveforms:

## 2021-02-08 DIAGNOSIS — F41.9 ANXIETY: ICD-10-CM

## 2021-02-08 RX ORDER — DULOXETIN HYDROCHLORIDE 60 MG/1
60 CAPSULE, DELAYED RELEASE ORAL DAILY
Qty: 90 CAP | Refills: 2 | Status: SHIPPED | OUTPATIENT
Start: 2021-02-08 | End: 2022-01-20 | Stop reason: SDUPTHER

## 2021-02-08 NOTE — TELEPHONE ENCOUNTER
[facility] on file sent a fax requesting a refill of   Requested Prescriptions     Pending Prescriptions Disp Refills    DULoxetine (CYMBALTA) 60 mg capsule 90 Cap 2     Sig: Take 1 Cap by mouth daily. Start ater finishing 30 mg capsules.

## 2021-02-08 NOTE — TELEPHONE ENCOUNTER
REFILL     PCP: Dona Resendiz MD     Last appt: 8/13/2020   Future Appointments   Date Time Provider Danna Ma   2/16/2021  9:30 AM Dona Resendiz MD [facility] AMB        Requested Prescriptions     Pending Prescriptions Disp Refills    DULoxetine (CYMBALTA) 60 mg capsule 90 Cap 2     Sig: Take 1 Cap by mouth daily. Start ater finishing 30 mg capsules.

## 2021-02-16 ENCOUNTER — VIRTUAL VISIT (OUTPATIENT)
Dept: INTERNAL MEDICINE CLINIC | Age: 66
End: 2021-02-16
Payer: COMMERCIAL

## 2021-02-16 DIAGNOSIS — R73.02 GLUCOSE INTOLERANCE (IMPAIRED GLUCOSE TOLERANCE): ICD-10-CM

## 2021-02-16 DIAGNOSIS — I10 HYPERTENSION, ESSENTIAL, BENIGN: Primary | ICD-10-CM

## 2021-02-16 DIAGNOSIS — J45.20 MILD INTERMITTENT ASTHMA WITHOUT COMPLICATION: ICD-10-CM

## 2021-02-16 DIAGNOSIS — M54.50 CHRONIC BILATERAL LOW BACK PAIN WITHOUT SCIATICA: ICD-10-CM

## 2021-02-16 DIAGNOSIS — E78.2 HYPERLIPIDEMIA, MIXED: ICD-10-CM

## 2021-02-16 DIAGNOSIS — G89.29 CHRONIC BILATERAL LOW BACK PAIN WITHOUT SCIATICA: ICD-10-CM

## 2021-02-16 PROCEDURE — 99212 OFFICE O/P EST SF 10 MIN: CPT | Performed by: INTERNAL MEDICINE

## 2021-02-16 NOTE — PROGRESS NOTES
Prince Mlaik is a 63 y.o. female, evaluated via audio-only technology on 2021 for Hypertension and Asthma      Assessment & Plan:     Diagnoses and all orders for this visit:    1. Hypertension, essential, benign  Controlled on current regimen, continue   2. Mild intermittent asthma without complication  Controlled on current regimen, continue   3. Hyperlipidemia, mixed  -     METABOLIC PANEL, COMPREHENSIVE; Future  -     LIPID PANEL; Future  Needs labs  4. Glucose intolerance (impaired glucose tolerance)  -     HEMOGLOBIN A1C WITH EAG; Future    5. Chronic bilateral low back pain without sciatica  Improved - followed by pain management    Follow-up and Dispositions    · Return for asthma, chol, bp - in person. Routing History          12  Subjective:   HTN - BP has been well controlled - around 130/70s - Denies chest pain, shortness of breath, dizziness/lightheadedness, headaches, visual changes, edema. Asthma - no cough or wheezing - hasn't needed albuterol lately    IFG - needs new a1c - eating smaller portions and eating less sweets and starches    Anxiety - controlled with duloxetine    Mother   - had chronic heart issues, had been ill for a while    Has continued to follow with pain management - on lyrica, no recent injections - pain much better than in the past    Prior to Admission medications    Medication Sig Start Date End Date Taking? Authorizing Provider   DULoxetine (CYMBALTA) 60 mg capsule Take 1 Cap by mouth daily. Start ater finishing 30 mg capsules. 21  Yes Julita Garrett MD   lisinopril-hydroCHLOROthiazide (PRINZIDE, ZESTORETIC) 10-12.5 mg per tablet TAKE 1 TABLET BY MOUTH EVERY DAY 10/5/20  Yes Julita Garrett MD   pregabalin (LYRICA) 75 mg capsule Take 75 mg by mouth daily.    Yes Provider, Historical   albuterol (PROVENTIL HFA, VENTOLIN HFA, PROAIR HFA) 90 mcg/actuation inhaler Take 1 Puff by inhalation every four (4) hours as needed for Wheezing. 18 Yes Vicenta Jason MD   ibuprofen (MOTRIN) 800 mg tablet Take 800 mg by mouth. 4/18/18  Yes Rush Man MD   calcium carbonate (TUMS) 200 mg calcium (500 mg) chew Take 1 Tab by mouth daily. Yes Provider, Historical         ROS    No flowsheet data found. Chanel Salinas, who was evaluated through a patient-initiated, synchronous (real-time) audio only encounter, and/or her healthcare decision maker, is aware that it is a billable service, with coverage as determined by her insurance carrier. She provided verbal consent to proceed: Yes. She has not had a related appointment within my department in the past 7 days or scheduled within the next 24 hours.       Total Time: minutes: 5-10 minutes    Jr Koroma MD

## 2021-02-16 NOTE — PROGRESS NOTES
Chanel Salinas  Identified pt with two pt identifiers(name and ). Chief Complaint   Patient presents with    Hypertension    Asthma       Reviewed record In preparation for visit and have obtained necessary documentation. 1. Have you been to the ER, urgent care clinic or hospitalized since your last visit? No     2. Have you seen or consulted any other health care providers outside of the [address] [place] [address] since your last visit? Include any pap smears or colon screening. No    Patient does not have an advance directive. Vitals reviewed with provider. Health Maintenance reviewed:     Health Maintenance Due   Topic    COVID-19 Vaccine (1 of 2)    PAP AKA CERVICAL CYTOLOGY     Breast Cancer Screen Mammogram           Wt Readings from Last 3 Encounters:   20 217 lb (98.4 kg)   20 217 lb (98.4 kg)   20 216 lb (98 kg)        Temp Readings from Last 3 Encounters:   20 98.6 °F (37 °C) (Oral)   12/10/19 98.3 °F (36.8 °C) (Oral)   19 97.9 °F (36.6 °C) (Oral)        BP Readings from Last 3 Encounters:   20 130/80   20 132/84   20 130/85        Pulse Readings from Last 3 Encounters:   20 76   20 82   20 85      There were no vitals filed for this visit. Learning Assessment:   :       Learning Assessment 2014   PRIMARY LEARNER Patient   PRIMARY LANGUAGE ENGLISH   LEARNER PREFERENCE PRIMARY DEMONSTRATION   ANSWERED BY patient   RELATIONSHIP SELF        Depression Screening:   :       3 most recent PHQ Screens 2021   Little interest or pleasure in doing things Not at all   Feeling down, depressed, irritable, or hopeless Not at all   Total Score PHQ 2 0        Fall Risk Assessment:   :       Fall Risk Assessment, last 12 mths 2018   Able to walk? Yes   Fall in past 12 months? No        Abuse Screening:   :       Abuse Screening Questionnaire 2019   Do you ever feel afraid of your partner?  N N   Are you in a relationship with someone who physically or mentally threatens you? N N   Is it safe for you to go home?  Y Y        ADL Screening:   :       ADL Assessment 1/23/2015   Feeding yourself No Help Needed   Getting from bed to chair No Help Needed   Getting dressed No Help Needed   Bathing or showering No Help Needed   Walk across the room (includes cane/walker) No Help Needed   Using the telphone No Help Needed   Taking your medications No Help Needed   Preparing meals No Help Needed   Managing money (expenses/bills) No Help Needed   Moderately strenuous housework (laundry) No Help Needed   Shopping for personal items (toiletries/medicines) No Help Needed   Shopping for groceries No Help Needed   Driving No Help Needed   Climbing a flight of stairs No Help Needed   Getting to places beyond walking distances No Help Needed

## 2021-02-26 ENCOUNTER — OFFICE VISIT (OUTPATIENT)
Dept: URGENT CARE | Age: 66
End: 2021-02-26
Payer: COMMERCIAL

## 2021-02-26 VITALS — OXYGEN SATURATION: 100 % | TEMPERATURE: 98.4 F | RESPIRATION RATE: 14 BRPM | HEART RATE: 73 BPM

## 2021-02-26 DIAGNOSIS — Z91.09 ENVIRONMENTAL ALLERGIES: Primary | ICD-10-CM

## 2021-02-26 DIAGNOSIS — R05.9 COUGH: ICD-10-CM

## 2021-02-26 PROCEDURE — 99203 OFFICE O/P NEW LOW 30 MIN: CPT | Performed by: NURSE PRACTITIONER

## 2021-02-26 NOTE — PROGRESS NOTES
This patient was seen at [address] [place] [address] [facility] while in their vehicle due to COVID-19 pandemic with PPE and focused examination in order to decrease community viral transmission. The patient/guardian gave verbal consent to treat. Kateryna Alcocer is a 63 y.o. female who presents for evaluation of cough x 2 days. Reports she received her COVID vaccine two days ago developed a dry cough the evening following. Reports cough is only present at night while lying down. Otherwise reports she is asymptomatic. Denies any symptoms such as wheezing, SOB, chest tightness, congestion, ST, HA, n/v/d, fever etc. No known exposure to COVID or sick contacts. No other complaints or concerns at this time. PMH: Asthma, HTN, HLD. Smoker/Non-smoker. The history is provided by the patient.         Past Medical History:   Diagnosis Date    Abnormal Pap smear 3/22/2011    cinization 1996     Alopecia areata     Asthmatic bronchitis     Cyst, breast     breast cyst on L     Hypercholesterolemia     Hypertension         Past Surgical History:   Procedure Laterality Date    HX BREAST BIOPSY Left     benign surgical bx    HX GYN      BTL         Family History   Problem Relation Age of Onset    Heart Disease Mother     Diabetes Mother     Cancer Mother         breast    Stroke Mother     Breast Cancer Mother         late 76s    Hypertension Father     Diabetes Father    [place] Isabela Dementia Father     Hypertension Sister     Hypertension Sister     Hypertension Brother     Elevated Lipids Brother     Elevated Lipids Brother     Diabetes Maternal Aunt         breast    Breast Cancer Maternal Aunt         possible 76s        Social History     Socioeconomic History    Marital status:      Spouse name: Not on file    Number of children: Not on file    Years of education: Not on file    Highest education level: Not on file   Occupational History    Not on file   Social Needs    Financial resource strain: Not on file    Food insecurity     Worry: Not on file     Inability: Not on file    Transportation needs     Medical: Not on file     Non-medical: Not on file   Tobacco Use    Smoking status: Former Smoker     Packs/day: 0.50     Years: 25.00     Pack years: 12.50     Quit date: 2015     Years since quittin.4    Smokeless tobacco: Never Used   Substance and Sexual Activity    Alcohol use: Yes     Alcohol/week: 0.0 standard drinks     Comment: occasional    Drug use: Not on file    Sexual activity: Yes     Partners: Male   Lifestyle    Physical activity     Days per week: Not on file     Minutes per session: Not on file    Stress: Not on file   Relationships    Social connections     Talks on phone: Not on file     Gets together: Not on file     Attends [employer] service: Not on file     Active member of club or organization: Not on file     Attends meetings of clubs or organizations: Not on file     Relationship status: Not on file    Intimate partner violence     Fear of current or ex partner: Not on file     Emotionally abused: Not on file     Physically abused: Not on file     Forced sexual activity: Not on file   Other Topics Concern    Not on file   Social History Narrative    Not on file                ALLERGIES: Latex, Penicillins, and Sulfa (sulfonamide antibiotics)    Review of Systems   Constitutional: Negative for activity change, appetite change, chills, diaphoresis, fatigue and fever. HENT: Positive for postnasal drip. Negative for congestion, ear pain, rhinorrhea, sinus pressure, sinus pain and sore throat. Respiratory: Positive for cough. Negative for chest tightness, shortness of breath and wheezing. Cardiovascular: Negative for chest pain. Gastrointestinal: Negative for abdominal pain, constipation, diarrhea, nausea and vomiting. Musculoskeletal: Negative for myalgias. Skin: Negative for rash.    Neurological: Negative for dizziness, weakness, light-headedness and headaches. Vitals:    02/26/21 1323   Pulse: 73   Resp: 14   Temp: 98.4 °F (36.9 °C)   SpO2: 100%       Physical Exam  Vitals signs and nursing note reviewed. Constitutional:       General: She is not in acute distress. Appearance: Normal appearance. She is not ill-appearing. HENT:      Head: Normocephalic and atraumatic. Mouth/Throat:      Mouth: Mucous membranes are moist.      Pharynx: Oropharynx is clear. No pharyngeal swelling or posterior oropharyngeal erythema. Comments: +PND  Eyes:      Conjunctiva/sclera: Conjunctivae normal.      Pupils: Pupils are equal, round, and reactive to light. Neck:      Musculoskeletal: Normal range of motion and neck supple. No muscular tenderness. Cardiovascular:      Rate and Rhythm: Normal rate and regular rhythm. Heart sounds: Normal heart sounds. No murmur. Pulmonary:      Effort: Pulmonary effort is normal.      Breath sounds: Normal breath sounds. No stridor. No wheezing, rhonchi or rales. Musculoskeletal: Normal range of motion. Lymphadenopathy:      Cervical: No cervical adenopathy. Skin:     General: Skin is warm and dry. Findings: No rash. Neurological:      Mental Status: She is alert and oriented to person, place, and time. Psychiatric:         Mood and Affect: Mood normal.         Behavior: Behavior normal.         MDM    Procedures        ICD-10-CM ICD-9-CM   1. Environmental allergies  Z91.09 V15.09   2. Cough  R05 786.2       Reassured patient regarding presentation today and normal PE. No evidence to suggest bronchitis or bacterial illness requiring antibiotics at this time. Encouraged to push fluids, use OTC antihistamine at bedtime, warm salt water gargles. Continue to monitor and follow up if symptoms no better over the next week. Declines COVID testing at this time due to lack of known exposure. The patient is to follow up with PCP PRN.      If signs and symptoms become worse the pt is to go to the ER.      Signed By: Bk Ayon NP     February 26, 2021

## 2021-03-04 NOTE — PROGRESS NOTES
Harinder Calvillo is a 63 y.o. female, evaluated via audio-only technology on 3/5/2021 for Cough (Yellow phlegm, has been taking Mucinex DM), Sinus Infection (Chest congestion ), and Insomnia  . Assessment & Plan:   Diagnoses and all orders for this visit:    1. Bronchitis  -     azithromycin (ZITHROMAX) 250 mg tablet; Take 1 Tab by mouth See Admin Instructions for 5 days. -     benzonatate (TESSALON) 200 mg capsule; Take 1 Cap by mouth three (3) times daily as needed for Cough for up to 7 days. Continue mucinex, push fluids          12  Subjective:     Pt c/o cough and congestion, cough prod of yellowish green sputum. Has tried mucinex dm and antihistamine but not getting relief. Cough is keeping her up at night. Denies SOB. Denies fevers or chills. Denies wheezing. She was [facility] at [facility] on [facility] for the same, dx with allergies and cough, tx with antihistamine. No known exposure to COVID, has received vaccine on 2-24. Prior to Admission medications    Medication Sig Start Date End Date Taking? Authorizing Provider   DULoxetine (CYMBALTA) 60 mg capsule Take 1 Cap by mouth daily. Start ater finishing 30 mg capsules. 2/8/21   Jerry Storey MD   lisinopril-hydroCHLOROthiazide (PRINZIDE, ZESTORETIC) 10-12.5 mg per tablet TAKE 1 TABLET BY MOUTH EVERY DAY 10/5/20   Jerry Storey MD   pregabalin (LYRICA) 75 mg capsule Take 75 mg by mouth daily. Provider, Historical   albuterol (PROVENTIL HFA, VENTOLIN HFA, PROAIR HFA) 90 mcg/actuation inhaler Take 1 Puff by inhalation every four (4) hours as needed for Wheezing. 7/27/18   Pilar Ireland MD   ibuprofen (MOTRIN) 800 mg tablet Take 800 mg by mouth. 4/18/18   Davi Parks MD   calcium carbonate (TUMS) 200 mg calcium (500 mg) chew Take 1 Tab by mouth daily. Provider, Historical         ROS see hpi      No flowsheet data found.      Harinder Calvillo, who was evaluated through a patient-initiated, synchronous (real-time) audio only encounter, and/or her healthcare decision maker, is aware that it is a billable service, with coverage as determined by her insurance carrier. She provided verbal consent to proceed: Yes. She has not had a related appointment within my department in the past 7 days or scheduled within the next 24 hours.       Total Time: minutes: 11-20 minutes    Chalo Whitney NP

## 2021-03-05 ENCOUNTER — VIRTUAL VISIT (OUTPATIENT)
Dept: INTERNAL MEDICINE CLINIC | Age: 66
End: 2021-03-05
Payer: COMMERCIAL

## 2021-03-05 DIAGNOSIS — J40 BRONCHITIS: Primary | ICD-10-CM

## 2021-03-05 PROCEDURE — 99213 OFFICE O/P EST LOW 20 MIN: CPT | Performed by: NURSE PRACTITIONER

## 2021-03-05 RX ORDER — AZITHROMYCIN 250 MG/1
250 TABLET, FILM COATED ORAL SEE ADMIN INSTRUCTIONS
Qty: 6 TAB | Refills: 0 | Status: SHIPPED | OUTPATIENT
Start: 2021-03-05 | End: 2021-03-10

## 2021-03-05 RX ORDER — GUAIFENESIN AND DEXTROMETHORPHAN HBR 20; 400 MG/1; MG/1
TABLET ORAL
COMMUNITY
End: 2021-06-30

## 2021-03-05 RX ORDER — BENZONATATE 200 MG/1
200 CAPSULE ORAL
Qty: 21 CAP | Refills: 0 | Status: SHIPPED | OUTPATIENT
Start: 2021-03-05 | End: 2021-03-12

## 2021-03-05 NOTE — PROGRESS NOTES
Sav Deep  Identified pt with two pt identifiers(name and ). Chief Complaint   Patient presents with    Cough     Yellow phlegm, has been taking Mucinex DM    Sinus Infection     Chest congestion     Insomnia       Reviewed record In preparation for visit and have obtained necessary documentation. 1. Have you been to the ER, urgent care clinic or hospitalized since your last visit? No     2. Have you seen or consulted any other health care providers outside of the [address] [place] [address] since your last visit? Include any pap smears or colon screening. No    Patient does not have an advance directive. Vitals reviewed with provider. Health Maintenance reviewed:     Health Maintenance Due   Topic    COVID-19 Vaccine (1 of 2)    PAP AKA CERVICAL CYTOLOGY     Breast Cancer Screen Mammogram           Wt Readings from Last 3 Encounters:   20 217 lb (98.4 kg)   20 217 lb (98.4 kg)   20 216 lb (98 kg)        Temp Readings from Last 3 Encounters:   21 98.4 °F (36.9 °C)   20 98.6 °F (37 °C) (Oral)   12/10/19 98.3 °F (36.8 °C) (Oral)        BP Readings from Last 3 Encounters:   20 130/80   20 132/84   20 130/85        Pulse Readings from Last 3 Encounters:   21 73   20 76   20 82      There were no vitals filed for this visit. Learning Assessment:   :       Learning Assessment 2014   PRIMARY LEARNER Patient   PRIMARY LANGUAGE ENGLISH   LEARNER PREFERENCE PRIMARY DEMONSTRATION   ANSWERED BY patient   RELATIONSHIP SELF        Depression Screening:   :       3 most recent PHQ Screens 2021   Little interest or pleasure in doing things Not at all   Feeling down, depressed, irritable, or hopeless Not at all   Total Score PHQ 2 0        Fall Risk Assessment:   :       Fall Risk Assessment, last 12 mths 2018   Able to walk? Yes   Fall in past 12 months?  No        Abuse Screening:   :       Abuse Screening Questionnaire 11/4/2019 7/27/2018   Do you ever feel afraid of your partner? N N   Are you in a relationship with someone who physically or mentally threatens you? N N   Is it safe for you to go home?  Y Y        ADL Screening:   :       ADL Assessment 1/23/2015   Feeding yourself No Help Needed   Getting from bed to chair No Help Needed   Getting dressed No Help Needed   Bathing or showering No Help Needed   Walk across the room (includes cane/walker) No Help Needed   Using the telphone No Help Needed   Taking your medications No Help Needed   Preparing meals No Help Needed   Managing money (expenses/bills) No Help Needed   Moderately strenuous housework (laundry) No Help Needed   Shopping for personal items (toiletries/medicines) No Help Needed   Shopping for groceries No Help Needed   Driving No Help Needed   Climbing a flight of stairs No Help Needed   Getting to places beyond walking distances No Help Needed

## 2021-03-05 NOTE — PATIENT INSTRUCTIONS
Bronchitis: Care Instructions Your Care Instructions Bronchitis is inflammation of the bronchial tubes, which carry air to the lungs. The tubes swell and produce mucus, or phlegm. The mucus and inflamed bronchial tubes make you cough. You may have trouble breathing. Most cases of bronchitis are caused by viruses like those that cause colds. Antibiotics usually do not help and they may be harmful. Bronchitis usually develops rapidly and lasts about 2 to 3 weeks in otherwise healthy people. Follow-up care is a key part of your treatment and safety. Be sure to make and go to all appointments, and call your doctor if you are having problems. It's also a good idea to know your test results and keep a list of the medicines you take. How can you care for yourself at home? · Take all medicines exactly as prescribed. Call your doctor if you think you are having a problem with your medicine. · Get some extra rest. 
· Take an over-the-counter pain medicine, such as acetaminophen (Tylenol), ibuprofen (Advil, Motrin), or naproxen (Aleve) to reduce fever and relieve body aches. Read and follow all instructions on the label. · Do not take two or more pain medicines at the same time unless the doctor told you to. Many pain medicines have acetaminophen, which is Tylenol. Too much acetaminophen (Tylenol) can be harmful. · Take an over-the-counter cough medicine that contains dextromethorphan to help quiet a dry, hacking cough so that you can sleep. Avoid cough medicines that have more than one active ingredient. Read and follow all instructions on the label. · Breathe moist air from a humidifier, hot shower, or sink filled with hot water. The heat and moisture will thin mucus so you can cough it out. · Do not smoke. Smoking can make bronchitis worse. If you need help quitting, talk to your doctor about stop-smoking programs and medicines. These can increase your chances of quitting for good. When should you call for help? 
 Call 911 anytime you think you may need emergency care. For example, call if: 
  · You have severe trouble breathing.  
Call your doctor now or seek immediate medical care if: 
  · You have new or worse trouble breathing.  
  · You cough up dark brown or bloody mucus (sputum).  
  · You have a new or higher fever.  
  · You have a new rash.  
Watch closely for changes in your health, and be sure to contact your doctor if: 
  · You cough more deeply or more often, especially if you notice more mucus or a change in the color of your mucus.  
  · You are not getting better as expected.  
Where can you learn more? 
Go to https://www.[employer].net/[employer] 
Enter H333 in the search box to learn more about \"Bronchitis: Care Instructions.\" 
Current as of: February 24, 2020               Content Version: 12.6 
© 6330-9764 [employer].  
Care instructions adapted under license by [employer] (which disclaims liability or warranty for this information). If you have questions about a medical condition or this instruction, always ask your healthcare professional. [employer] disclaims any warranty or liability for your use of this information.

## 2021-04-05 DIAGNOSIS — I10 HYPERTENSION, ESSENTIAL, BENIGN: Primary | ICD-10-CM

## 2021-04-05 RX ORDER — LISINOPRIL AND HYDROCHLOROTHIAZIDE 10; 12.5 MG/1; MG/1
TABLET ORAL
Qty: 90 TAB | Refills: 1 | Status: SHIPPED | OUTPATIENT
Start: 2021-04-05 | End: 2021-10-10

## 2021-04-05 NOTE — TELEPHONE ENCOUNTER
Requested Prescriptions     Pending Prescriptions Disp Refills    lisinopril-hydroCHLOROthiazide (PRINZIDE, ZESTORETIC) 10-12.5 mg per tablet 90 Tab 1

## 2021-04-05 NOTE — TELEPHONE ENCOUNTER
PCP: Kelechi Pham MD    Last appt: 3/5/2021  Future Appointments   Date Time Provider Danna Ma   5/19/2021  3:30 PM Kelechi Pham MD [facility]       Requested Prescriptions     Pending Prescriptions Disp Refills    lisinopril-hydroCHLOROthiazide (PRINZIDE, ZESTORETIC) 10-12.5 mg per tablet 90 Tab 1     Sig: TAKE 1 TABLET BY MOUTH EVERY DAY

## 2021-06-30 ENCOUNTER — TRANSCRIBE ORDER (OUTPATIENT)
Dept: SCHEDULING | Age: 66
End: 2021-06-30

## 2021-06-30 ENCOUNTER — OFFICE VISIT (OUTPATIENT)
Dept: INTERNAL MEDICINE CLINIC | Age: 66
End: 2021-06-30
Payer: COMMERCIAL

## 2021-06-30 VITALS
HEIGHT: 63 IN | RESPIRATION RATE: 16 BRPM | SYSTOLIC BLOOD PRESSURE: 125 MMHG | OXYGEN SATURATION: 98 % | DIASTOLIC BLOOD PRESSURE: 82 MMHG | WEIGHT: 210.2 LBS | HEART RATE: 69 BPM | BODY MASS INDEX: 37.25 KG/M2 | TEMPERATURE: 98.2 F

## 2021-06-30 DIAGNOSIS — R73.02 GLUCOSE INTOLERANCE (IMPAIRED GLUCOSE TOLERANCE): ICD-10-CM

## 2021-06-30 DIAGNOSIS — I10 HYPERTENSION, ESSENTIAL, BENIGN: Primary | ICD-10-CM

## 2021-06-30 DIAGNOSIS — E66.01 SEVERE OBESITY (BMI 35.0-39.9) WITH COMORBIDITY (HCC): ICD-10-CM

## 2021-06-30 DIAGNOSIS — Z12.31 VISIT FOR SCREENING MAMMOGRAM: Primary | ICD-10-CM

## 2021-06-30 PROCEDURE — 99214 OFFICE O/P EST MOD 30 MIN: CPT | Performed by: INTERNAL MEDICINE

## 2021-06-30 RX ORDER — LIDOCAINE 50 MG/G
PATCH TOPICAL
COMMUNITY

## 2021-06-30 RX ORDER — IBUPROFEN AND FAMOTIDINE 800; 26.6 MG/1; MG/1
TABLET, COATED ORAL
COMMUNITY
End: 2022-11-01

## 2021-06-30 NOTE — PROGRESS NOTES
HPI  Ms. Brenna Thompson is a 63y.o. year old female, she is seen today for follow up HTN. BP at home is normally 120/80s. Denies chest pain, shortness of breath, dizziness/lightheadedness, headaches, visual changes, edema. Follows low Na diet, limits red meat in diet. Has been eating more carbs lately, making it more difficult to lose weight. Has lost 7# since last visit. Walking for exercise. Chief Complaint   Patient presents with    Hypertension    Weight Management     Pt would like to discuss weight loss supplements         Prior to Admission medications    Medication Sig Start Date End Date Taking? Authorizing Provider   ibuprofen-famotidine (Duexis) 800-26.6 mg tab Duexis 800 mg-26.6 mg tablet   Yes Provider, Historical   lidocaine (LIDODERM) 5 % lidocaine 5 % topical patch   APPLY 1 PATCH BY TOPICAL ROUTE ONCE DAILY 12 hours on and 12 hours off   Yes Provider, Historical   lisinopril-hydroCHLOROthiazide (PRINZIDE, ZESTORETIC) 10-12.5 mg per tablet TAKE 1 TABLET BY MOUTH EVERY DAY 4/5/21  Yes Tena Bingham MD   DULoxetine (CYMBALTA) 60 mg capsule Take 1 Cap by mouth daily. Start ater finishing 30 mg capsules. 2/8/21  Yes Emanuel Jiang MD   pregabalin (LYRICA) 75 mg capsule Take 75 mg by mouth daily. Yes Provider, Historical   ibuprofen (MOTRIN) 800 mg tablet Take 800 mg by mouth. 4/18/18  Yes Juju Pathak MD   calcium carbonate (TUMS) 200 mg calcium (500 mg) chew Take 1 Tab by mouth daily. Yes Provider, Historical   guaiFENesin-dextromethorphan (Mucus Relief DM)  mg tab tablet Take  by mouth.   Patient not taking: Reported on 6/30/2021 6/30/21  Provider, Historical         Allergies   Allergen Reactions    Latex Hives    Penicillins Hives    Sulfa (Sulfonamide Antibiotics) Other (comments)     Bactrim - rash         REVIEW OF SYSTEMS:  Per HPI    PHYSICAL EXAM:  Visit Vitals  /82 (BP 1 Location: Right arm, BP Patient Position: Sitting, BP Cuff Size: Large adult)   Pulse 69   Temp 98.2 °F (36.8 °C) (Oral)   Resp 16   Ht 5' 3\" (1.6 m)   Wt 210 lb 3.2 oz (95.3 kg)   SpO2 98%   BMI 37.24 kg/m²     Constitutional: Appears well-developed and well-nourished. No distress. HENT:   Head: Normocephalic and atraumatic. Eyes: No scleral icterus. Neck: no lad, no tm, supple   Cardiovascular: Normal S1/S2, regular rhythm. No murmurs, rubs, or gallops. Pulmonary/Chest: Effort normal and breath sounds normal. No respiratory distress. No wheezes, rhonchi, or rales. Abdomen: Soft, NT/ND, +BS, no rebound or guarding, no masses, no HSM appreciated. Ext: No edema. Neurological: Alert. Psychiatric: Normal mood and affect. Behavior is normal.     Lab Results   Component Value Date/Time    Sodium 143 04/26/2018 09:10 AM    Potassium 4.5 04/26/2018 09:10 AM    Chloride 101 04/26/2018 09:10 AM    CO2 26 04/26/2018 09:10 AM    Glucose 95 04/26/2018 09:10 AM    BUN 13 04/26/2018 09:10 AM    Creatinine 0.75 04/26/2018 09:10 AM    BUN/Creatinine ratio 17 04/26/2018 09:10 AM    GFR est  04/26/2018 09:10 AM    GFR est non-AA 87 04/26/2018 09:10 AM    Calcium 9.5 04/26/2018 09:10 AM    Bilirubin, total 0.6 04/26/2018 09:10 AM    Alk. phosphatase 85 04/26/2018 09:10 AM    Protein, total 6.9 04/26/2018 09:10 AM    Albumin 3.9 04/26/2018 09:10 AM    A-G Ratio 1.3 04/26/2018 09:10 AM    ALT (SGPT) 13 04/26/2018 09:10 AM     Lab Results   Component Value Date/Time    Hemoglobin A1c 5.8 (H) 12/20/2018 08:12 AM    Hemoglobin A1c 5.7 (H) 04/26/2018 09:10 AM    Hemoglobin A1c 6.1 (H) 10/11/2016 10:07 AM      Lab Results   Component Value Date/Time    Cholesterol, total 183 12/20/2018 08:12 AM    HDL Cholesterol 43 12/20/2018 08:12 AM    LDL, calculated 120 (H) 12/20/2018 08:12 AM    VLDL, calculated 20 12/20/2018 08:12 AM    Triglyceride 99 12/20/2018 08:12 AM          ASSESSMENT/PLAN  Diagnoses and all orders for this visit:    1.  Hypertension, essential, benign  Controlled on current regimen, continue   2. Glucose intolerance (impaired glucose tolerance)  Will get labs previously ordered - offered ozempic for this and weight loss and she declined  3. Severe obesity (BMI 35.0-39. 9) with comorbidity ([address]  -     TSH 3RD GENERATION; Future  -     T4, FREE; Future  -     REFERRAL TO DIETITIAN  Check labs, refer to dietician        Health Maintenance Due   Topic Date Due    PAP AKA CERVICAL CYTOLOGY  10/30/2016    Breast Cancer Screen Mammogram  11/20/2020        Follow-up and Dispositions    · Return in about 4 months (around 10/30/2021) for bp, weight. Reviewed plan of care. Patient has provided input and agrees with goals. The nurse provided the patient and/or family with advanced directive information if needed and encouraged the patient to provide a copy to the office when available.

## 2021-06-30 NOTE — PROGRESS NOTES
Mattie Landry  Identified pt with two pt identifiers(name and ). Chief Complaint   Patient presents with    Hypertension    Weight Management     Pt would like to discuss weight loss supplements        Reviewed record In preparation for visit and have obtained necessary documentation. 1. Have you been to the ER, urgent care clinic or hospitalized since your last visit? No     2. Have you seen or consulted any other health care providers outside of the [address] [place] [address] since your last visit? Include any pap smears or colon screening. No    Patient does not have an advance directive. Vitals reviewed with provider.     Health Maintenance reviewed:     Health Maintenance Due   Topic    PAP AKA CERVICAL CYTOLOGY     Breast Cancer Screen Mammogram           Wt Readings from Last 3 Encounters:   21 210 lb 3.2 oz (95.3 kg)   20 217 lb (98.4 kg)   20 217 lb (98.4 kg)        Temp Readings from Last 3 Encounters:   21 98.2 °F (36.8 °C) (Oral)   21 98.4 °F (36.9 °C)   20 98.6 °F (37 °C) (Oral)        BP Readings from Last 3 Encounters:   21 125/82   20 130/80   20 132/84        Pulse Readings from Last 3 Encounters:   21 69   21 73   20 76        Vitals:    21 1132   BP: 125/82   Pulse: 69   Resp: 16   Temp: 98.2 °F (36.8 °C)   TempSrc: Oral   SpO2: 98%   Weight: 210 lb 3.2 oz (95.3 kg)   Height: 5' 3\" (1.6 m)   PainSc:   0 - No pain          Learning Assessment:   :       Learning Assessment 2014   PRIMARY LEARNER Patient   PRIMARY LANGUAGE ENGLISH   LEARNER PREFERENCE PRIMARY DEMONSTRATION   ANSWERED BY patient   RELATIONSHIP SELF        Depression Screening:   :       3 most recent PHQ Screens 2021   Little interest or pleasure in doing things Not at all   Feeling down, depressed, irritable, or hopeless Not at all   Total Score PHQ 2 0        Fall Risk Assessment:   :       Fall Risk Assessment, last 12 mths 7/27/2018   Able to walk? Yes   Fall in past 12 months? No        Abuse Screening:   :       Abuse Screening Questionnaire 11/4/2019 7/27/2018   Do you ever feel afraid of your partner? N N   Are you in a relationship with someone who physically or mentally threatens you? N N   Is it safe for you to go home?  Y Y        ADL Screening:   :       ADL Assessment 1/23/2015   Feeding yourself No Help Needed   Getting from bed to chair No Help Needed   Getting dressed No Help Needed   Bathing or showering No Help Needed   Walk across the room (includes cane/walker) No Help Needed   Using the telphone No Help Needed   Taking your medications No Help Needed   Preparing meals No Help Needed   Managing money (expenses/bills) No Help Needed   Moderately strenuous housework (laundry) No Help Needed   Shopping for personal items (toiletries/medicines) No Help Needed   Shopping for groceries No Help Needed   Driving No Help Needed   Climbing a flight of stairs No Help Needed   Getting to places beyond walking distances No Help Needed

## 2021-07-21 ENCOUNTER — HOSPITAL ENCOUNTER (OUTPATIENT)
Dept: MAMMOGRAPHY | Age: 66
Discharge: HOME | End: 2021-07-21
Attending: INTERNAL MEDICINE
Payer: COMMERCIAL

## 2021-07-21 DIAGNOSIS — Z12.31 VISIT FOR SCREENING MAMMOGRAM: ICD-10-CM

## 2021-07-21 PROCEDURE — 77063 BREAST TOMOSYNTHESIS BI: CPT

## 2021-10-10 DIAGNOSIS — I10 HYPERTENSION, ESSENTIAL, BENIGN: ICD-10-CM

## 2021-10-10 RX ORDER — LISINOPRIL AND HYDROCHLOROTHIAZIDE 10; 12.5 MG/1; MG/1
TABLET ORAL
Qty: 90 TABLET | Refills: 1 | Status: SHIPPED | OUTPATIENT
Start: 2021-10-10 | End: 2022-01-20 | Stop reason: SDUPTHER

## 2022-01-20 ENCOUNTER — OFFICE VISIT (OUTPATIENT)
Dept: INTERNAL MEDICINE CLINIC | Age: 67
End: 2022-01-20
Payer: COMMERCIAL

## 2022-01-20 VITALS
TEMPERATURE: 98.2 F | OXYGEN SATURATION: 93 % | BODY MASS INDEX: 38.24 KG/M2 | WEIGHT: 215.8 LBS | SYSTOLIC BLOOD PRESSURE: 126 MMHG | HEART RATE: 81 BPM | DIASTOLIC BLOOD PRESSURE: 78 MMHG | RESPIRATION RATE: 16 BRPM | HEIGHT: 63 IN

## 2022-01-20 DIAGNOSIS — I10 HYPERTENSION, ESSENTIAL, BENIGN: ICD-10-CM

## 2022-01-20 DIAGNOSIS — F41.9 ANXIETY: ICD-10-CM

## 2022-01-20 DIAGNOSIS — E66.01 SEVERE OBESITY (BMI 35.0-35.9 WITH COMORBIDITY) (HCC): ICD-10-CM

## 2022-01-20 DIAGNOSIS — R73.01 IFG (IMPAIRED FASTING GLUCOSE): Primary | ICD-10-CM

## 2022-01-20 PROCEDURE — 99214 OFFICE O/P EST MOD 30 MIN: CPT | Performed by: INTERNAL MEDICINE

## 2022-01-20 RX ORDER — OMEPRAZOLE 20 MG/1
20 TABLET, DELAYED RELEASE ORAL DAILY
COMMUNITY

## 2022-01-20 RX ORDER — LISINOPRIL AND HYDROCHLOROTHIAZIDE 10; 12.5 MG/1; MG/1
1 TABLET ORAL DAILY
Qty: 30 TABLET | Refills: 5 | Status: SHIPPED | OUTPATIENT
Start: 2022-01-20 | End: 2022-03-01 | Stop reason: SDUPTHER

## 2022-01-20 RX ORDER — DULOXETIN HYDROCHLORIDE 60 MG/1
60 CAPSULE, DELAYED RELEASE ORAL DAILY
Qty: 30 CAPSULE | Refills: 5 | Status: SHIPPED | OUTPATIENT
Start: 2022-01-20 | End: 2022-11-01 | Stop reason: SDUPTHER

## 2022-01-20 NOTE — PROGRESS NOTES
Asa Ramirez  Identified pt with two pt identifiers(name and ). Chief Complaint   Patient presents with    Hypertension    Weight Management       Reviewed record In preparation for visit and have obtained necessary documentation. 1. Have you been to the ER, urgent care clinic or hospitalized since your last visit? No     2. Have you seen or consulted any other health care providers outside of the [address] [place] [address] since your last visit? Include any pap smears or colon screening. No    Patient does not have an advance directive. Vitals reviewed with provider. Health Maintenance reviewed: There are no preventive care reminders to display for this patient. Wt Readings from Last 3 Encounters:   22 215 lb 12.8 oz (97.9 kg)   21 210 lb 3.2 oz (95.3 kg)   20 217 lb (98.4 kg)        Temp Readings from Last 3 Encounters:   22 98.2 °F (36.8 °C) (Oral)   21 98.2 °F (36.8 °C) (Oral)   21 98.4 °F (36.9 °C)        BP Readings from Last 3 Encounters:   22 126/78   21 125/82   20 130/80        Pulse Readings from Last 3 Encounters:   22 81   21 69   21 73        Vitals:    22 1107   BP: 126/78   Pulse: 81   Resp: 16   Temp: 98.2 °F (36.8 °C)   TempSrc: Oral   SpO2: 93%   Weight: 215 lb 12.8 oz (97.9 kg)   Height: 5' 3\" (1.6 m)   PainSc:   0 - No pain          Learning Assessment:   :       Learning Assessment 2014   PRIMARY LEARNER Patient   PRIMARY LANGUAGE ENGLISH   LEARNER PREFERENCE PRIMARY DEMONSTRATION   ANSWERED BY patient   RELATIONSHIP SELF        Depression Screening:   :       3 most recent PHQ Screens 2022   Little interest or pleasure in doing things Not at all   Feeling down, depressed, irritable, or hopeless Several days   Total Score PHQ 2 1        Fall Risk Assessment:   :       Fall Risk Assessment, last 12 mths 2022   Able to walk?  Yes   Fall in past 12 months? 0   Do you feel unsteady? 0   Are you worried about falling 0        Abuse Screening:   :       Abuse Screening Questionnaire 11/4/2019 7/27/2018   Do you ever feel afraid of your partner? N N   Are you in a relationship with someone who physically or mentally threatens you? N N   Is it safe for you to go home?  Y Y        ADL Screening:   :       ADL Assessment 1/23/2015   Feeding yourself No Help Needed   Getting from bed to chair No Help Needed   Getting dressed No Help Needed   Bathing or showering No Help Needed   Walk across the room (includes cane/walker) No Help Needed   Using the telphone No Help Needed   Taking your medications No Help Needed   Preparing meals No Help Needed   Managing money (expenses/bills) No Help Needed   Moderately strenuous housework (laundry) No Help Needed   Shopping for personal items (toiletries/medicines) No Help Needed   Shopping for groceries No Help Needed   Driving No Help Needed   Climbing a flight of stairs No Help Needed   Getting to places beyond walking distances No Help Needed

## 2022-01-20 NOTE — PROGRESS NOTES
HPI  Ms. Chiki Arango is a 61y.o. year old female, she is seen today for follow up HTN, weight. Father and mother had been ill in  and  in  and she got off track with eating and schedule. 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 Mood is good - not sad, down, depressed or worried or anxious - gets down sometimes but doesn't last    Now eating better, preparing meals at home. Will see gyn next week. No chest pain, sob, dizziness, weakness, lightheadedness. Chief Complaint   Patient presents with    Hypertension    Weight Management        Prior to Admission medications    Medication Sig Start Date End Date Taking? Authorizing Provider   omeprazole (PriLOSEC OTC) 20 mg tablet Take 20 mg by mouth daily. Yes Provider, Historical   DULoxetine (CYMBALTA) 60 mg capsule Take 1 Capsule by mouth daily. Start ater finishing 30 mg capsules. 22  Yes Criss Montanez MD   lisinopril-hydroCHLOROthiazide (PRINZIDE, ZESTORETIC) 10-12.5 mg per tablet Take 1 Tablet by mouth daily. 22  Yes Criss Montanez MD   lidocaine (LIDODERM) 5 % lidocaine 5 % topical patch   APPLY 1 PATCH BY TOPICAL ROUTE ONCE DAILY 12 hours on and 12 hours off   Yes Provider, Historical   ibuprofen (MOTRIN) 800 mg tablet Take 800 mg by mouth. 18  Yes Ryann Lin MD   calcium carbonate (TUMS) 200 mg calcium (500 mg) chew Take 1 Tab by mouth daily. Yes Provider, Historical   lisinopril-hydroCHLOROthiazide (PRINZIDE, ZESTORETIC) 10-12.5 mg per tablet TAKE 1 TABLET BY MOUTH EVERY DAY 10/10/21 1/20/22  Criss Motnanez MD   ibuprofen-famotidine (Duexis) 800-26.6 mg tab Duexis 800 mg-26.6 mg tablet  Patient not taking: Reported on 2022    Provider, Historical   DULoxetine (CYMBALTA) 60 mg capsule Take 1 Cap by mouth daily. Start ater finishing 30 mg capsules.   Patient not taking: Reported on 2022  Criss Montanez MD   pregabalin (LYRICA) 75 mg capsule Take 75 mg by mouth daily. Patient not taking: Reported on 1/20/2022    Provider, Historical         Allergies   Allergen Reactions    Latex Hives    Penicillins Hives    Sulfa (Sulfonamide Antibiotics) Other (comments)     Bactrim - rash         REVIEW OF SYSTEMS:  Per HPI    PHYSICAL EXAM:  Visit Vitals  /78 (BP 1 Location: Left upper arm, BP Patient Position: Sitting, BP Cuff Size: Large adult)   Pulse 81   Temp 98.2 °F (36.8 °C) (Oral)   Resp 16   Ht 5' 3\" (1.6 m)   Wt 215 lb 12.8 oz (97.9 kg)   SpO2 93%   BMI 38.23 kg/m²     Constitutional: Appears well-developed and well-nourished. No distress. HENT:   Head: Normocephalic and atraumatic. Eyes: No scleral icterus. Neck: no lad, no tm, supple   Cardiovascular: Normal S1/S2, regular rhythm. No murmurs, rubs, or gallops. Pulmonary/Chest: Effort normal and breath sounds normal. No respiratory distress. No wheezes, rhonchi, or rales. Ext: No edema. Neurological: Alert. Psychiatric: Normal mood and affect. Behavior is normal.     Lab Results   Component Value Date/Time    Sodium 143 04/26/2018 09:10 AM    Potassium 4.5 04/26/2018 09:10 AM    Chloride 101 04/26/2018 09:10 AM    CO2 26 04/26/2018 09:10 AM    Glucose 95 04/26/2018 09:10 AM    BUN 13 04/26/2018 09:10 AM    Creatinine 0.75 04/26/2018 09:10 AM    BUN/Creatinine ratio 17 04/26/2018 09:10 AM    GFR est  04/26/2018 09:10 AM    GFR est non-AA 87 04/26/2018 09:10 AM    Calcium 9.5 04/26/2018 09:10 AM    Bilirubin, total 0.6 04/26/2018 09:10 AM    Alk.  phosphatase 85 04/26/2018 09:10 AM    Protein, total 6.9 04/26/2018 09:10 AM    Albumin 3.9 04/26/2018 09:10 AM    A-G Ratio 1.3 04/26/2018 09:10 AM    ALT (SGPT) 13 04/26/2018 09:10 AM     Lab Results   Component Value Date/Time    Hemoglobin A1c 5.8 (H) 12/20/2018 08:12 AM    Hemoglobin A1c 5.7 (H) 04/26/2018 09:10 AM    Hemoglobin A1c 6.1 (H) 10/11/2016 10:07 AM      Lab Results   Component Value Date/Time    Cholesterol, total 183 12/20/2018 08:12 AM    HDL Cholesterol 43 12/20/2018 08:12 AM    LDL, calculated 120 (H) 12/20/2018 08:12 AM    VLDL, calculated 20 12/20/2018 08:12 AM    Triglyceride 99 12/20/2018 08:12 AM          ASSESSMENT/PLAN  Diagnoses and all orders for this visit:    1. IFG (impaired fasting glucose)  -     HEMOGLOBIN A1C WITH EAG; Future    2. Anxiety  -     DULoxetine (CYMBALTA) 60 mg capsule; Take 1 Capsule by mouth daily. Start ater finishing 30 mg capsules. -     TSH 3RD GENERATION; Future  -     T4, FREE; Future    3. Hypertension, essential, benign  -     lisinopril-hydroCHLOROthiazide (PRINZIDE, ZESTORETIC) 10-12.5 mg per tablet; Take 1 Tablet by mouth daily.  -     LIPID PANEL; Future  -     METABOLIC PANEL, COMPREHENSIVE; Future    4. Severe obesity (BMI 35.0-35.9 with comorbidity) (HCC)    BP at goal, continue current medications. She will work on exercise and diet changes for weight loss. Anxiety well-controlled on current regimen, continue. There are no preventive care reminders to display for this patient. Follow-up and Dispositions    · Return in about 6 months (around 7/20/2022) for bp, med eval.            Reviewed plan of care. Patient has provided input and agrees with goals. The nurse provided the patient and/or family with advanced directive information if needed and encouraged the patient to provide a copy to the office when available.

## 2022-03-01 DIAGNOSIS — I10 HYPERTENSION, ESSENTIAL, BENIGN: ICD-10-CM

## 2022-03-01 RX ORDER — LISINOPRIL AND HYDROCHLOROTHIAZIDE 10; 12.5 MG/1; MG/1
1 TABLET ORAL DAILY
Qty: 30 TABLET | Refills: 5 | Status: SHIPPED | OUTPATIENT
Start: 2022-03-01 | End: 2022-11-01 | Stop reason: SDUPTHER

## 2022-03-01 NOTE — TELEPHONE ENCOUNTER
PCP: Concepcion Victoria MD     Last appt: 1/20/2022   Future Appointments   Date Time Provider Danna Ma   7/21/2022  9:30 AM Concepcion Victoria MD [facility] AMB        Requested Prescriptions     Pending Prescriptions Disp Refills    lisinopril-hydroCHLOROthiazide (PRINZIDE, ZESTORETIC) 10-12.5 mg per tablet 30 Tablet 5     Sig: Take 1 Tablet by mouth daily.

## 2022-03-01 NOTE — TELEPHONE ENCOUNTER
Pt called and stated that she lost this rx and can not find it.  Pt stated that she needs this medication today and will be coming into [place] within the hour and has been out for 2 days now and can tell she has not been taking this medication

## 2022-03-18 PROBLEM — E55.9 VITAMIN D DEFICIENCY: Status: ACTIVE | Noted: 2018-06-23

## 2022-03-19 PROBLEM — M17.11 PRIMARY OSTEOARTHRITIS OF RIGHT KNEE: Status: ACTIVE | Noted: 2018-06-23

## 2022-03-19 PROBLEM — E66.01 SEVERE OBESITY (BMI 35.0-35.9 WITH COMORBIDITY) (HCC): Status: ACTIVE | Noted: 2018-06-22

## 2022-03-20 PROBLEM — M54.50 CHRONIC BILATERAL LOW BACK PAIN WITHOUT SCIATICA: Status: ACTIVE | Noted: 2018-06-23

## 2022-03-20 PROBLEM — R92.8 ABNORMAL MAMMOGRAM OF LEFT BREAST: Status: ACTIVE | Noted: 2019-11-20

## 2022-03-20 PROBLEM — G89.29 CHRONIC BILATERAL LOW BACK PAIN WITHOUT SCIATICA: Status: ACTIVE | Noted: 2018-06-23

## 2022-06-30 ENCOUNTER — TELEPHONE (OUTPATIENT)
Dept: INTERNAL MEDICINE CLINIC | Age: 67
End: 2022-06-30

## 2022-06-30 NOTE — TELEPHONE ENCOUNTER
I called and verified the patient by name and date of birth, then proceeded with the message from the provider. The patient understood and had no questions at this time.
I called the patient and verified them by name and date of birth. Tested positive for COVID yesterday - had a little scratchy throat. Cough started last night. Been taking Tylenol & will start Zicam this evening. Also had chills & felt like she had the flu. Not a normal sore throat feeling - scratchy & burning.
I left a message for the patient to call back.
Pt tested positive for covid and is struggling and wants to know what she can take or do if anything
Recommend Tylenol or Ibuprofen for throat pain, and fever/chills. Recommend warm salt water gargles 4 times a day. Drink plenty of fluids and rest as much as possible. Drink warm drinks that are soothing to throat like tea and soup. May take mucinex DM for cough.
Left arm;

## 2022-09-13 ENCOUNTER — TRANSCRIBE ORDER (OUTPATIENT)
Dept: SCHEDULING | Age: 67
End: 2022-09-13

## 2022-09-13 DIAGNOSIS — Z12.31 VISIT FOR SCREENING MAMMOGRAM: Primary | ICD-10-CM

## 2022-10-20 ENCOUNTER — HOSPITAL ENCOUNTER (OUTPATIENT)
Dept: MAMMOGRAPHY | Age: 67
Discharge: HOME | End: 2022-10-20
Attending: INTERNAL MEDICINE
Payer: MEDICARE

## 2022-10-20 DIAGNOSIS — Z12.31 VISIT FOR SCREENING MAMMOGRAM: ICD-10-CM

## 2022-10-20 PROCEDURE — 77063 BREAST TOMOSYNTHESIS BI: CPT

## 2022-11-01 ENCOUNTER — OFFICE VISIT (OUTPATIENT)
Dept: INTERNAL MEDICINE CLINIC | Age: 67
End: 2022-11-01
Payer: MEDICARE

## 2022-11-01 VITALS
WEIGHT: 220.5 LBS | OXYGEN SATURATION: 98 % | TEMPERATURE: 97.7 F | DIASTOLIC BLOOD PRESSURE: 80 MMHG | HEIGHT: 63 IN | HEART RATE: 72 BPM | RESPIRATION RATE: 12 BRPM | SYSTOLIC BLOOD PRESSURE: 124 MMHG | BODY MASS INDEX: 39.07 KG/M2

## 2022-11-01 DIAGNOSIS — R73.02 GLUCOSE INTOLERANCE (IMPAIRED GLUCOSE TOLERANCE): ICD-10-CM

## 2022-11-01 DIAGNOSIS — E78.2 HYPERLIPIDEMIA, MIXED: ICD-10-CM

## 2022-11-01 DIAGNOSIS — F41.9 ANXIETY: ICD-10-CM

## 2022-11-01 DIAGNOSIS — I10 HYPERTENSION, ESSENTIAL, BENIGN: Primary | ICD-10-CM

## 2022-11-01 PROCEDURE — 3074F SYST BP LT 130 MM HG: CPT | Performed by: INTERNAL MEDICINE

## 2022-11-01 PROCEDURE — 1090F PRES/ABSN URINE INCON ASSESS: CPT | Performed by: INTERNAL MEDICINE

## 2022-11-01 PROCEDURE — G8417 CALC BMI ABV UP PARAM F/U: HCPCS | Performed by: INTERNAL MEDICINE

## 2022-11-01 PROCEDURE — G8400 PT W/DXA NO RESULTS DOC: HCPCS | Performed by: INTERNAL MEDICINE

## 2022-11-01 PROCEDURE — G8510 SCR DEP NEG, NO PLAN REQD: HCPCS | Performed by: INTERNAL MEDICINE

## 2022-11-01 PROCEDURE — G8536 NO DOC ELDER MAL SCRN: HCPCS | Performed by: INTERNAL MEDICINE

## 2022-11-01 PROCEDURE — G8754 DIAS BP LESS 90: HCPCS | Performed by: INTERNAL MEDICINE

## 2022-11-01 PROCEDURE — G8427 DOCREV CUR MEDS BY ELIG CLIN: HCPCS | Performed by: INTERNAL MEDICINE

## 2022-11-01 PROCEDURE — G9899 SCRN MAM PERF RSLTS DOC: HCPCS | Performed by: INTERNAL MEDICINE

## 2022-11-01 PROCEDURE — 3017F COLORECTAL CA SCREEN DOC REV: CPT | Performed by: INTERNAL MEDICINE

## 2022-11-01 PROCEDURE — 1123F ACP DISCUSS/DSCN MKR DOCD: CPT | Performed by: INTERNAL MEDICINE

## 2022-11-01 PROCEDURE — 3078F DIAST BP <80 MM HG: CPT | Performed by: INTERNAL MEDICINE

## 2022-11-01 PROCEDURE — 99214 OFFICE O/P EST MOD 30 MIN: CPT | Performed by: INTERNAL MEDICINE

## 2022-11-01 PROCEDURE — 1101F PT FALLS ASSESS-DOCD LE1/YR: CPT | Performed by: INTERNAL MEDICINE

## 2022-11-01 PROCEDURE — G8752 SYS BP LESS 140: HCPCS | Performed by: INTERNAL MEDICINE

## 2022-11-01 RX ORDER — LISINOPRIL AND HYDROCHLOROTHIAZIDE 10; 12.5 MG/1; MG/1
1 TABLET ORAL DAILY
Qty: 30 TABLET | Refills: 5 | Status: SHIPPED | OUTPATIENT
Start: 2022-11-01

## 2022-11-01 RX ORDER — DULOXETIN HYDROCHLORIDE 60 MG/1
60 CAPSULE, DELAYED RELEASE ORAL DAILY
Qty: 30 CAPSULE | Refills: 5 | Status: SHIPPED | OUTPATIENT
Start: 2022-11-01

## 2022-11-01 NOTE — PROGRESS NOTES
Anna Murphy  Identified pt with two pt identifiers(name and ). Chief Complaint   Patient presents with    Hypertension     Room 2A    Cholesterol Problem    Blood sugar problem       Reviewed record In preparation for visit and have obtained necessary documentation. 1. Have you been to the ER, urgent care clinic or hospitalized since your last visit? No     2. Have you seen or consulted any other health care providers outside of the [address] [place] [address] since your last visit? Include any pap smears or colon screening. No    Vitals reviewed with provider.     Health Maintenance reviewed:     Health Maintenance Due   Topic    Pneumococcal 65+ years (1 - PCV)    Cervical cancer screen     COVID-19 Vaccine (4 - Booster for Moderna series)    Flu Vaccine (1)    Bone Densitometry (Dexa) Screening     Medicare Yearly Exam           Wt Readings from Last 3 Encounters:   22 220 lb 8 oz (100 kg)   22 215 lb 12.8 oz (97.9 kg)   21 210 lb 3.2 oz (95.3 kg)        Temp Readings from Last 3 Encounters:   22 97.7 °F (36.5 °C) (Oral)   22 98.2 °F (36.8 °C) (Oral)   21 98.2 °F (36.8 °C) (Oral)        BP Readings from Last 3 Encounters:   22 124/80   22 126/78   21 125/82        Pulse Readings from Last 3 Encounters:   22 72   22 81   21 69        Vitals:    22 1523   BP: 124/80   Pulse: 72   Resp: 12   Temp: 97.7 °F (36.5 °C)   TempSrc: Oral   SpO2: 98%   Weight: 220 lb 8 oz (100 kg)   Height: 5' 3\" (1.6 m)   PainSc:   3   PainLoc: Knee          Learning Assessment:   :       Learning Assessment 2014   PRIMARY LEARNER Patient   PRIMARY LANGUAGE ENGLISH   LEARNER PREFERENCE PRIMARY DEMONSTRATION   ANSWERED BY patient   RELATIONSHIP SELF        Depression Screening:   :       3 most recent PHQ Screens 2022   Little interest or pleasure in doing things Not at all   Feeling down, depressed, irritable, or hopeless Not at all Total Score PHQ 2 0        Fall Risk Assessment:   :       Fall Risk Assessment, last 12 mths 1/20/2022   Able to walk? Yes   Fall in past 12 months? 0   Do you feel unsteady? 0   Are you worried about falling 0        Abuse Screening:   :       Abuse Screening Questionnaire 11/1/2022 11/4/2019 7/27/2018   Do you ever feel afraid of your partner? N N N   Are you in a relationship with someone who physically or mentally threatens you? N N N   Is it safe for you to go home?  Y Y Y        ADL Screening:   :       ADL Assessment 1/23/2015   Feeding yourself No Help Needed   Getting from bed to chair No Help Needed   Getting dressed No Help Needed   Bathing or showering No Help Needed   Walk across the room (includes cane/walker) No Help Needed   Using the telphone No Help Needed   Taking your medications No Help Needed   Preparing meals No Help Needed   Managing money (expenses/bills) No Help Needed   Moderately strenuous housework (laundry) No Help Needed   Shopping for personal items (toiletries/medicines) No Help Needed   Shopping for groceries No Help Needed   Driving No Help Needed   Climbing a flight of stairs No Help Needed   Getting to places beyond walking distances No Help Needed

## 2022-11-01 NOTE — PROGRESS NOTES
HPI  Ms. Jacki Callejas is a 74y.o. year old female, she is seen today for follow up HTN, cholesterol, anxiety. Checks BP periodically, normally okay. No chest pain, sob, dizziness, weakness, lightheadedness. Has been trying to lose weight, trying to limit red meat and pork. Knows she needs to cut back on cheese, higher calorie foods. Mood good - not anxious, down or depressed. Went to [place] last month for vacation - good to get away. Chief Complaint   Patient presents with    Hypertension     Room 2A    Cholesterol Problem    Blood sugar problem        Prior to Admission medications    Medication Sig Start Date End Date Taking? Authorizing Provider   lisinopril-hydroCHLOROthiazide (PRINZIDE, ZESTORETIC) 10-12.5 mg per tablet Take 1 Tablet by mouth daily. 11/1/22  Yes Guerita Linares MD   DULoxetine (CYMBALTA) 60 mg capsule Take 1 Capsule by mouth daily. Start ater finishing 30 mg capsules. 11/1/22  Yes Guerita Linares MD   omeprazole (PRILOSEC OTC) 20 mg tablet Take 20 mg by mouth daily. Yes Provider, Historical   lidocaine (LIDODERM) 5 % lidocaine 5 % topical patch   APPLY 1 PATCH BY TOPICAL ROUTE ONCE DAILY 12 hours on and 12 hours off   Yes Provider, Historical   ibuprofen (MOTRIN) 800 mg tablet Take 800 mg by mouth. 4/18/18  Yes Liliana Argueta MD   calcium carbonate (TUMS) 200 mg calcium (500 mg) chew Take 1 Tab by mouth daily. Yes Provider, Historical   lisinopril-hydroCHLOROthiazide (PRINZIDE, ZESTORETIC) 10-12.5 mg per tablet Take 1 Tablet by mouth daily. 3/1/22 11/1/22  Guerita Linares MD   DULoxetine (CYMBALTA) 60 mg capsule Take 1 Capsule by mouth daily. Start ater finishing 30 mg capsules. 1/20/22 11/1/22  Guerita Linares MD   ibuprofen-famotidine (Duexis) 800-26.6 mg tab Duexis 800 mg-26.6 mg tablet  11/1/22  Provider, Historical   pregabalin (LYRICA) 75 mg capsule Take 75 mg by mouth daily.   11/1/22  Provider, Historical         Allergies   Allergen Reactions    Latex Hives    Penicillins Hives    Sulfa (Sulfonamide Antibiotics) Other (comments)     Bactrim - rash         REVIEW OF SYSTEMS:  Per HPI    PHYSICAL EXAM:  Visit Vitals  /80 (BP 1 Location: Left upper arm, BP Patient Position: Sitting)   Pulse 72   Temp 97.7 °F (36.5 °C) (Oral)   Resp 12   Ht 5' 3\" (1.6 m)   Wt 220 lb 8 oz (100 kg)   SpO2 98%   BMI 39.06 kg/m²     Constitutional: Appears well-developed and well-nourished. No distress. HENT:   Head: Normocephalic and atraumatic. Eyes: No scleral icterus. Cardiovascular: Normal S1/S2, regular rhythm. No murmurs, rubs, or gallops. Pulmonary/Chest: Effort normal and breath sounds normal. No respiratory distress. No wheezes, rhonchi, or rales. Abdomen: Soft, NT/ND, +BS, no rebound or guarding, no masses, no HSM appreciated. Ext: No edema. Neurological: Alert. Psychiatric: Normal mood and affect. Behavior is normal.     Lab Results   Component Value Date/Time    Sodium 143 04/26/2018 09:10 AM    Potassium 4.5 04/26/2018 09:10 AM    Chloride 101 04/26/2018 09:10 AM    CO2 26 04/26/2018 09:10 AM    Glucose 95 04/26/2018 09:10 AM    BUN 13 04/26/2018 09:10 AM    Creatinine 0.75 04/26/2018 09:10 AM    BUN/Creatinine ratio 17 04/26/2018 09:10 AM    GFR est  04/26/2018 09:10 AM    GFR est non-AA 87 04/26/2018 09:10 AM    Calcium 9.5 04/26/2018 09:10 AM    Bilirubin, total 0.6 04/26/2018 09:10 AM    Alk.  phosphatase 85 04/26/2018 09:10 AM    Protein, total 6.9 04/26/2018 09:10 AM    Albumin 3.9 04/26/2018 09:10 AM    A-G Ratio 1.3 04/26/2018 09:10 AM    ALT (SGPT) 13 04/26/2018 09:10 AM     Lab Results   Component Value Date/Time    Hemoglobin A1c 5.8 (H) 12/20/2018 08:12 AM    Hemoglobin A1c 5.7 (H) 04/26/2018 09:10 AM    Hemoglobin A1c 6.1 (H) 10/11/2016 10:07 AM      Lab Results   Component Value Date/Time    Cholesterol, total 183 12/20/2018 08:12 AM    HDL Cholesterol 43 12/20/2018 08:12 AM    LDL, calculated 120 (H) 12/20/2018 08:12 AM    VLDL, calculated 20 12/20/2018 08:12 AM    Triglyceride 99 12/20/2018 08:12 AM          ASSESSMENT/PLAN  Diagnoses and all orders for this visit:    1. Hypertension, essential, benign  -     lisinopril-hydroCHLOROthiazide (PRINZIDE, ZESTORETIC) 10-12.5 mg per tablet; Take 1 Tablet by mouth daily.  -     METABOLIC PANEL, COMPREHENSIVE; Future  -     LIPID PANEL; Future    2. Anxiety  -     DULoxetine (CYMBALTA) 60 mg capsule; Take 1 Capsule by mouth daily. Start ater finishing 30 mg capsules. 3. Glucose intolerance (impaired glucose tolerance)  -     HEMOGLOBIN A1C WITH EAG; Future    4. Hyperlipidemia, mixed  -     TSH 3RD GENERATION; Future  -     T4, FREE; Future    Bp at goal, continue lisinopril/hct  Anxiety controlled, continue duloxetine  Due for lipids - get labs    Health Maintenance Due   Topic Date Due    Pneumococcal 65+ years (1 - PCV) Never done    Cervical cancer screen  Never done    COVID-19 Vaccine (4 - Booster for Moderna series) 01/17/2022    Flu Vaccine (1) 08/01/2022    Bone Densitometry (Dexa) Screening  Never done    Medicare Yearly Exam  10/20/2022        Follow-up and Dispositions    Return in about 6 months (around 5/1/2023) for bp, anxiety, AWV. Reviewed plan of care. Patient has provided input and agrees with goals. The nurse provided the patient and/or family with advanced directive information if needed and encouraged the patient to provide a copy to the office when available.

## 2023-05-02 ENCOUNTER — OFFICE VISIT (OUTPATIENT)
Dept: INTERNAL MEDICINE CLINIC | Age: 68
End: 2023-05-02
Payer: MEDICARE

## 2023-05-02 VITALS
HEART RATE: 95 BPM | DIASTOLIC BLOOD PRESSURE: 83 MMHG | RESPIRATION RATE: 12 BRPM | OXYGEN SATURATION: 98 % | SYSTOLIC BLOOD PRESSURE: 136 MMHG | WEIGHT: 216 LBS | TEMPERATURE: 98.7 F | HEIGHT: 63 IN | BODY MASS INDEX: 38.27 KG/M2

## 2023-05-02 DIAGNOSIS — R73.02 GLUCOSE INTOLERANCE (IMPAIRED GLUCOSE TOLERANCE): ICD-10-CM

## 2023-05-02 DIAGNOSIS — E55.9 VITAMIN D DEFICIENCY: ICD-10-CM

## 2023-05-02 DIAGNOSIS — J01.90 ACUTE NON-RECURRENT SINUSITIS, UNSPECIFIED LOCATION: Primary | ICD-10-CM

## 2023-05-02 DIAGNOSIS — I10 HYPERTENSION, ESSENTIAL, BENIGN: ICD-10-CM

## 2023-05-02 DIAGNOSIS — J40 BRONCHITIS: ICD-10-CM

## 2023-05-02 DIAGNOSIS — E78.2 HYPERLIPIDEMIA, MIXED: ICD-10-CM

## 2023-05-02 DIAGNOSIS — E66.01 SEVERE OBESITY (BMI 35.0-39.9) WITH COMORBIDITY (HCC): ICD-10-CM

## 2023-05-02 PROCEDURE — 3079F DIAST BP 80-89 MM HG: CPT | Performed by: INTERNAL MEDICINE

## 2023-05-02 PROCEDURE — G9899 SCRN MAM PERF RSLTS DOC: HCPCS | Performed by: INTERNAL MEDICINE

## 2023-05-02 PROCEDURE — 1101F PT FALLS ASSESS-DOCD LE1/YR: CPT | Performed by: INTERNAL MEDICINE

## 2023-05-02 PROCEDURE — G8417 CALC BMI ABV UP PARAM F/U: HCPCS | Performed by: INTERNAL MEDICINE

## 2023-05-02 PROCEDURE — G8427 DOCREV CUR MEDS BY ELIG CLIN: HCPCS | Performed by: INTERNAL MEDICINE

## 2023-05-02 PROCEDURE — 99214 OFFICE O/P EST MOD 30 MIN: CPT | Performed by: INTERNAL MEDICINE

## 2023-05-02 PROCEDURE — 3017F COLORECTAL CA SCREEN DOC REV: CPT | Performed by: INTERNAL MEDICINE

## 2023-05-02 PROCEDURE — 1123F ACP DISCUSS/DSCN MKR DOCD: CPT | Performed by: INTERNAL MEDICINE

## 2023-05-02 PROCEDURE — G8400 PT W/DXA NO RESULTS DOC: HCPCS | Performed by: INTERNAL MEDICINE

## 2023-05-02 PROCEDURE — 3075F SYST BP GE 130 - 139MM HG: CPT | Performed by: INTERNAL MEDICINE

## 2023-05-02 PROCEDURE — G8432 DEP SCR NOT DOC, RNG: HCPCS | Performed by: INTERNAL MEDICINE

## 2023-05-02 PROCEDURE — G8536 NO DOC ELDER MAL SCRN: HCPCS | Performed by: INTERNAL MEDICINE

## 2023-05-02 PROCEDURE — 1090F PRES/ABSN URINE INCON ASSESS: CPT | Performed by: INTERNAL MEDICINE

## 2023-05-02 RX ORDER — METHYLPREDNISOLONE 4 MG/1
TABLET ORAL
Qty: 1 DOSE PACK | Refills: 0 | Status: SHIPPED | OUTPATIENT
Start: 2023-05-02 | End: 2023-05-08

## 2023-05-02 RX ORDER — AZITHROMYCIN 250 MG/1
TABLET, FILM COATED ORAL
Qty: 6 TABLET | Refills: 0 | Status: SHIPPED | OUTPATIENT
Start: 2023-05-02

## 2023-05-02 RX ORDER — DOXYCYCLINE 100 MG/1
100 CAPSULE ORAL 2 TIMES DAILY
COMMUNITY
Start: 2023-04-30 | End: 2023-05-10

## 2023-05-02 RX ORDER — BENZONATATE 100 MG/1
100 CAPSULE ORAL
COMMUNITY
Start: 2023-04-30 | End: 2023-05-10

## 2023-05-02 RX ORDER — ALBUTEROL SULFATE 90 UG/1
1-2 AEROSOL, METERED RESPIRATORY (INHALATION)
Qty: 18 G | Refills: 5 | Status: SHIPPED | OUTPATIENT
Start: 2023-05-02

## 2023-05-11 ENCOUNTER — TELEPHONE (OUTPATIENT)
Facility: CLINIC | Age: 68
End: 2023-05-11

## 2023-05-11 NOTE — TELEPHONE ENCOUNTER
Pt states she is still coughing mostly at night and mornings. Bringing up yellow / green mucous, uses albuterol and it helps some. Uses [employer].

## 2023-05-11 NOTE — TELEPHONE ENCOUNTER
Pt needs this med refill sent to [facility] in [facility] the other [place] is closed and she needs to pick it up today

## 2023-05-11 NOTE — TELEPHONE ENCOUNTER
Continue to use albuterol, increase to tid, try taking doxycyline again (stopped due to GI upset, but take with food instead). Multiple abx allergies.

## 2023-05-11 NOTE — TELEPHONE ENCOUNTER
Pt called and she said she had been prescribed steroids for some previous issues and she finished them on Monday but she is still experiencing the same things.  She would like to know what to do from here?:

## 2023-05-17 RX ORDER — ALBUTEROL SULFATE 90 UG/1
1-2 AEROSOL, METERED RESPIRATORY (INHALATION) EVERY 4 HOURS PRN
COMMUNITY
Start: 2023-05-02

## 2023-05-17 NOTE — TELEPHONE ENCOUNTER
PCP: Williams Clarke MD     Last appt: 05/02/2023    Future Appointments   Date Time Provider Tulio De La Cruz   6/7/2023 11:30 AM Severa Key, MD [facility] AMB          Requested Prescriptions     Pending Prescriptions Disp Refills    lisinopril-hydroCHLOROthiazide (PRINZIDE;ZESTORETIC) 10-12.5 MG per tablet 90 tablet 3     Sig: Take 1 tablet by mouth daily

## 2023-05-18 RX ORDER — LISINOPRIL AND HYDROCHLOROTHIAZIDE 12.5; 1 MG/1; MG/1
1 TABLET ORAL DAILY
Qty: 90 TABLET | Refills: 3 | Status: SHIPPED | OUTPATIENT
Start: 2023-05-18

## 2023-07-31 SDOH — ECONOMIC STABILITY: INCOME INSECURITY: HOW HARD IS IT FOR YOU TO PAY FOR THE VERY BASICS LIKE FOOD, HOUSING, MEDICAL CARE, AND HEATING?: NOT HARD AT ALL

## 2023-07-31 SDOH — HEALTH STABILITY: PHYSICAL HEALTH: ON AVERAGE, HOW MANY MINUTES DO YOU ENGAGE IN EXERCISE AT THIS LEVEL?: 0 MIN

## 2023-07-31 SDOH — ECONOMIC STABILITY: TRANSPORTATION INSECURITY
IN THE PAST 12 MONTHS, HAS LACK OF TRANSPORTATION KEPT YOU FROM MEETINGS, WORK, OR FROM GETTING THINGS NEEDED FOR DAILY LIVING?: NO

## 2023-07-31 SDOH — ECONOMIC STABILITY: HOUSING INSECURITY
IN THE LAST 12 MONTHS, WAS THERE A TIME WHEN YOU DID NOT HAVE A STEADY PLACE TO SLEEP OR SLEPT IN A SHELTER (INCLUDING NOW)?: NO

## 2023-07-31 SDOH — ECONOMIC STABILITY: FOOD INSECURITY: WITHIN THE PAST 12 MONTHS, THE FOOD YOU BOUGHT JUST DIDN'T LAST AND YOU DIDN'T HAVE MONEY TO GET MORE.: NEVER TRUE

## 2023-07-31 SDOH — ECONOMIC STABILITY: FOOD INSECURITY: WITHIN THE PAST 12 MONTHS, YOU WORRIED THAT YOUR FOOD WOULD RUN OUT BEFORE YOU GOT MONEY TO BUY MORE.: NEVER TRUE

## 2023-07-31 SDOH — HEALTH STABILITY: PHYSICAL HEALTH: ON AVERAGE, HOW MANY DAYS PER WEEK DO YOU ENGAGE IN MODERATE TO STRENUOUS EXERCISE (LIKE A BRISK WALK)?: 0 DAYS

## 2023-07-31 ASSESSMENT — PATIENT HEALTH QUESTIONNAIRE - PHQ9
1. LITTLE INTEREST OR PLEASURE IN DOING THINGS: 0
2. FEELING DOWN, DEPRESSED OR HOPELESS: 0
SUM OF ALL RESPONSES TO PHQ QUESTIONS 1-9: 0
SUM OF ALL RESPONSES TO PHQ9 QUESTIONS 1 & 2: 0

## 2023-07-31 ASSESSMENT — LIFESTYLE VARIABLES
HOW OFTEN DO YOU HAVE A DRINK CONTAINING ALCOHOL: MONTHLY OR LESS
HOW OFTEN DO YOU HAVE A DRINK CONTAINING ALCOHOL: 2
HOW MANY STANDARD DRINKS CONTAINING ALCOHOL DO YOU HAVE ON A TYPICAL DAY: 1 OR 2
HOW MANY STANDARD DRINKS CONTAINING ALCOHOL DO YOU HAVE ON A TYPICAL DAY: 1
HOW OFTEN DO YOU HAVE SIX OR MORE DRINKS ON ONE OCCASION: 1

## 2023-08-01 ENCOUNTER — OFFICE VISIT (OUTPATIENT)
Facility: CLINIC | Age: 68
End: 2023-08-01

## 2023-08-01 VITALS
OXYGEN SATURATION: 96 % | SYSTOLIC BLOOD PRESSURE: 125 MMHG | DIASTOLIC BLOOD PRESSURE: 80 MMHG | HEIGHT: 63 IN | WEIGHT: 222 LBS | BODY MASS INDEX: 39.34 KG/M2 | RESPIRATION RATE: 12 BRPM | HEART RATE: 71 BPM | TEMPERATURE: 98.3 F

## 2023-08-01 DIAGNOSIS — Z00.00 WELCOME TO MEDICARE PREVENTIVE VISIT: Primary | ICD-10-CM

## 2023-08-01 DIAGNOSIS — J45.20 MILD INTERMITTENT ASTHMA WITHOUT COMPLICATION: ICD-10-CM

## 2023-08-01 DIAGNOSIS — E55.9 VITAMIN D DEFICIENCY: ICD-10-CM

## 2023-08-01 DIAGNOSIS — R73.02 GLUCOSE INTOLERANCE (IMPAIRED GLUCOSE TOLERANCE): ICD-10-CM

## 2023-08-01 DIAGNOSIS — Z78.0 ASYMPTOMATIC MENOPAUSAL STATE: ICD-10-CM

## 2023-08-01 DIAGNOSIS — I10 HYPERTENSION, ESSENTIAL, BENIGN: ICD-10-CM

## 2023-08-01 DIAGNOSIS — E78.2 HYPERLIPIDEMIA, MIXED: ICD-10-CM

## 2023-08-01 DIAGNOSIS — F41.9 ANXIETY DISORDER, UNSPECIFIED TYPE: ICD-10-CM

## 2023-08-01 DIAGNOSIS — E66.01 SEVERE OBESITY (BMI 35.0-39.9) WITH COMORBIDITY (HCC): ICD-10-CM

## 2023-08-01 LAB
25(OH)D3+25(OH)D2 SERPL-MCNC: 15.8 NG/ML (ref 30–100)
ALBUMIN SERPL-MCNC: 4.1 G/DL (ref 3.9–4.9)
ALBUMIN/GLOB SERPL: 1.5 {RATIO} (ref 1.2–2.2)
ALP SERPL-CCNC: 105 IU/L (ref 44–121)
ALT SERPL-CCNC: 14 IU/L (ref 0–32)
AST SERPL-CCNC: 14 IU/L (ref 0–40)
BILIRUB SERPL-MCNC: 0.5 MG/DL (ref 0–1.2)
BUN SERPL-MCNC: 10 MG/DL (ref 8–27)
BUN/CREAT SERPL: 14 (ref 12–28)
CALCIUM SERPL-MCNC: 9.6 MG/DL (ref 8.7–10.3)
CHLORIDE SERPL-SCNC: 104 MMOL/L (ref 96–106)
CHOLEST SERPL-MCNC: 179 MG/DL (ref 100–199)
CO2 SERPL-SCNC: 27 MMOL/L (ref 20–29)
CREAT SERPL-MCNC: 0.71 MG/DL (ref 0.57–1)
EGFRCR SERPLBLD CKD-EPI 2021: 94 ML/MIN/1.73
EST. AVERAGE GLUCOSE BLD GHB EST-MCNC: 123 MG/DL
GLOBULIN SER CALC-MCNC: 2.8 G/DL (ref 1.5–4.5)
GLUCOSE SERPL-MCNC: 114 MG/DL (ref 70–99)
HBA1C MFR BLD: 5.9 % (ref 4.8–5.6)
HDLC SERPL-MCNC: 42 MG/DL
LDLC SERPL CALC-MCNC: 113 MG/DL (ref 0–99)
POTASSIUM SERPL-SCNC: 4.2 MMOL/L (ref 3.5–5.2)
PROT SERPL-MCNC: 6.9 G/DL (ref 6–8.5)
SODIUM SERPL-SCNC: 142 MMOL/L (ref 134–144)
TRIGL SERPL-MCNC: 137 MG/DL (ref 0–149)
VLDLC SERPL CALC-MCNC: 24 MG/DL (ref 5–40)

## 2023-08-01 RX ORDER — DULOXETIN HYDROCHLORIDE 30 MG/1
30 CAPSULE, DELAYED RELEASE ORAL DAILY
Qty: 90 CAPSULE | Refills: 1 | Status: SHIPPED | OUTPATIENT
Start: 2023-08-01

## 2023-08-01 RX ORDER — ERGOCALCIFEROL 1.25 MG/1
50000 CAPSULE ORAL WEEKLY
Qty: 12 CAPSULE | Refills: 1 | Status: SHIPPED | OUTPATIENT
Start: 2023-08-01

## 2023-09-01 ENCOUNTER — TELEPHONE (OUTPATIENT)
Facility: CLINIC | Age: 68
End: 2023-09-01

## 2023-09-01 NOTE — TELEPHONE ENCOUNTER
----- Message from Anoop File sent at 8/31/2023 11:37 AM EDT -----  Subject: Message to Provider    QUESTIONS  Information for Provider? Called to get confirmation on a request that was   sent over on 8/22 . Its in regards records of cd scans and mri . Please   reach out to confirm we have received and sent them back out . Azra Arora   [phone] ext [phone]  ---------------------------------------------------------------------------  --------------  Kandy Boucher INFO  [phone]; OK to leave message on voicemail  ---------------------------------------------------------------------------  --------------  SCRIPT ANSWERS  Relationship to Patient? Covered Entity  Covered Entity Type? Health Insurance? Representative Name?  Thalia

## 2023-10-27 ENCOUNTER — HOSPITAL ENCOUNTER (OUTPATIENT)
Facility: HOSPITAL | Age: 68
End: 2023-10-27
Attending: INTERNAL MEDICINE
Payer: MEDICARE

## 2023-10-27 VITALS — WEIGHT: 222 LBS | BODY MASS INDEX: 39.34 KG/M2 | HEIGHT: 63 IN

## 2023-10-27 DIAGNOSIS — Z78.0 ASYMPTOMATIC MENOPAUSAL STATE: ICD-10-CM

## 2023-10-27 DIAGNOSIS — Z12.31 SCREENING MAMMOGRAM FOR HIGH-RISK PATIENT: ICD-10-CM

## 2023-10-27 PROCEDURE — 77080 DXA BONE DENSITY AXIAL: CPT

## 2023-10-27 PROCEDURE — 77063 BREAST TOMOSYNTHESIS BI: CPT

## 2024-01-21 NOTE — LETTER
[address] 
[facility] EMERGENCY DEPT 
[address] [place] [address] [address] [phone] 
[phone] Work/School Note Date: 10/3/2017 To Whom It May concern: Pancho Cabezas was seen and treated today in the emergency room by the following provider(s): 
Attending Provider: Sheldon Lyons MD 
Physician Assistant: HU Greenwood. Pancho Cabezas may return to work in 1-2 days. Sincerely, Mary Pérez, [address] [facility] [place] 
 
 
 
 3 days history of URI symptoms   RVP (-)   - Supportive care   - Mucinex for cough

## 2024-01-28 DIAGNOSIS — E55.9 VITAMIN D DEFICIENCY: ICD-10-CM

## 2024-01-28 DIAGNOSIS — E78.2 HYPERLIPIDEMIA, MIXED: ICD-10-CM

## 2024-01-31 RX ORDER — ERGOCALCIFEROL 1.25 MG/1
50000 CAPSULE ORAL WEEKLY
Qty: 12 CAPSULE | Refills: 1 | Status: SHIPPED | OUTPATIENT
Start: 2024-01-31

## 2024-01-31 NOTE — TELEPHONE ENCOUNTER
PCP: Yudelka Terry MD     Last appt: 8/1/2023    Future Appointments   Date Time Provider Department Center   2/7/2024 10:30 AM Yudelka Terry MD [facility] AMB          Requested Prescriptions     Pending Prescriptions Disp Refills    vitamin D (ERGOCALCIFEROL) 1.25 MG (69405 UT) CAPS capsule 12 capsule 1     Sig: Take 1 capsule by mouth once a week

## 2024-04-10 ENCOUNTER — OFFICE VISIT (OUTPATIENT)
Facility: CLINIC | Age: 69
End: 2024-04-10
Payer: MEDICARE

## 2024-04-10 VITALS
DIASTOLIC BLOOD PRESSURE: 84 MMHG | BODY MASS INDEX: 40.4 KG/M2 | HEART RATE: 75 BPM | OXYGEN SATURATION: 96 % | SYSTOLIC BLOOD PRESSURE: 130 MMHG | HEIGHT: 63 IN | WEIGHT: 228 LBS | TEMPERATURE: 97.9 F | RESPIRATION RATE: 12 BRPM

## 2024-04-10 DIAGNOSIS — E66.01 OBESITY, CLASS III, BMI 40-49.9 (MORBID OBESITY) (HCC): ICD-10-CM

## 2024-04-10 DIAGNOSIS — E78.2 HYPERLIPIDEMIA, MIXED: ICD-10-CM

## 2024-04-10 DIAGNOSIS — E55.9 VITAMIN D DEFICIENCY: ICD-10-CM

## 2024-04-10 DIAGNOSIS — I10 HYPERTENSION, ESSENTIAL, BENIGN: Primary | ICD-10-CM

## 2024-04-10 DIAGNOSIS — J45.20 MILD INTERMITTENT ASTHMA WITHOUT COMPLICATION: ICD-10-CM

## 2024-04-10 DIAGNOSIS — R35.0 URINARY FREQUENCY: ICD-10-CM

## 2024-04-10 DIAGNOSIS — F41.1 GENERALIZED ANXIETY DISORDER: ICD-10-CM

## 2024-04-10 DIAGNOSIS — R73.02 GLUCOSE INTOLERANCE (IMPAIRED GLUCOSE TOLERANCE): ICD-10-CM

## 2024-04-10 PROCEDURE — 3079F DIAST BP 80-89 MM HG: CPT | Performed by: INTERNAL MEDICINE

## 2024-04-10 PROCEDURE — 99214 OFFICE O/P EST MOD 30 MIN: CPT | Performed by: INTERNAL MEDICINE

## 2024-04-10 PROCEDURE — 1123F ACP DISCUSS/DSCN MKR DOCD: CPT | Performed by: INTERNAL MEDICINE

## 2024-04-10 PROCEDURE — G8399 PT W/DXA RESULTS DOCUMENT: HCPCS | Performed by: INTERNAL MEDICINE

## 2024-04-10 PROCEDURE — G8417 CALC BMI ABV UP PARAM F/U: HCPCS | Performed by: INTERNAL MEDICINE

## 2024-04-10 PROCEDURE — 1036F TOBACCO NON-USER: CPT | Performed by: INTERNAL MEDICINE

## 2024-04-10 PROCEDURE — 1090F PRES/ABSN URINE INCON ASSESS: CPT | Performed by: INTERNAL MEDICINE

## 2024-04-10 PROCEDURE — 3075F SYST BP GE 130 - 139MM HG: CPT | Performed by: INTERNAL MEDICINE

## 2024-04-10 PROCEDURE — 3017F COLORECTAL CA SCREEN DOC REV: CPT | Performed by: INTERNAL MEDICINE

## 2024-04-10 PROCEDURE — G8427 DOCREV CUR MEDS BY ELIG CLIN: HCPCS | Performed by: INTERNAL MEDICINE

## 2024-04-10 RX ORDER — LISINOPRIL 20 MG/1
20 TABLET ORAL DAILY
Qty: 90 TABLET | Refills: 1 | Status: SHIPPED | OUTPATIENT
Start: 2024-04-10

## 2024-04-10 ASSESSMENT — PATIENT HEALTH QUESTIONNAIRE - PHQ9
SUM OF ALL RESPONSES TO PHQ QUESTIONS 1-9: 0
2. FEELING DOWN, DEPRESSED OR HOPELESS: NOT AT ALL
SUM OF ALL RESPONSES TO PHQ QUESTIONS 1-9: 0
1. LITTLE INTEREST OR PLEASURE IN DOING THINGS: NOT AT ALL
SUM OF ALL RESPONSES TO PHQ9 QUESTIONS 1 & 2: 0
SUM OF ALL RESPONSES TO PHQ QUESTIONS 1-9: 0
SUM OF ALL RESPONSES TO PHQ QUESTIONS 1-9: 0

## 2024-04-10 NOTE — PROGRESS NOTES
Patricia Junior  Identified pt with two pt identifiers(name and ).     Chief Complaint   Patient presents with    Hypertension    Cholesterol Problem    vitamin d def    Urinary Urgency     Over the last 6 months       Reviewed record In preparation for visit and have obtained necessary documentation.     1. Have you been to the ER, urgent care clinic or hospitalized since your last visit? No     2. Have you seen or consulted any other health care providers outside of the [facility] since your last visit? Include any pap smears or colon screening. No    Vitals reviewed with provider.    Health Maintenance reviewed:     Health Maintenance Due   Topic    Pneumococcal 65+ years Vaccine (1 of 2 - PCV)    Shingles vaccine (1 of 2)    Respiratory Syncytial Virus (RSV) Pregnant or age 60 yrs+ (1 - 1-dose 60+ series)    COVID-19 Vaccine ( season)    Annual Wellness Visit (Medicare Advantage)           Wt Readings from Last 3 Encounters:   04/10/24 103.4 kg (228 lb)   10/27/23 100.7 kg (222 lb)   10/27/23 100.7 kg (222 lb)        Temp Readings from Last 3 Encounters:   04/10/24 97.9 °F (36.6 °C) (Oral)   23 98.3 °F (36.8 °C) (Oral)        BP Readings from Last 3 Encounters:   04/10/24 (!) 147/92   23 125/80   23 136/83        Pulse Readings from Last 3 Encounters:   04/10/24 75   23 71   23 95      [unfilled]       Learning Assessment:   :         2023    11:00 AM   [facility] AMB LEARNING ASSESSMENT   Primary Learner Patient   level of education [employer]   Barriers Factors NONE   co-learner caregiver No   Primary Language ENGLISH   Learning Preference DEMONSTRATION   Answered By patient   Relationship to Learner SELF         Fall Risk Assessment:   :         2023     5:40 PM 2022    11:00 AM   Amb Fall Risk Assessment and TUG Test   Do you feel unsteady or are you worried about falling?  no    2 or more falls in past year? no    Fall with injury in past 
Hydroxy    Urinary frequency    Anxiety controlled - continue current medications   Work on exercise, portion control for weight loss  Check A1c  Kegels for urinary frequency and stop hctz - increase lisinopril to 20mg daily  Asthma well controlled       Health Maintenance Due   Topic Date Due    Pneumococcal 65+ years Vaccine (1 of 2 - PCV) Never done    Shingles vaccine (1 of 2) Never done    Respiratory Syncytial Virus (RSV) Pregnant or age 60 yrs+ (1 - 1-dose 60+ series) Never done    COVID-19 Vaccine (4 - 2023-24 season) 09/01/2023    Annual Wellness Visit (Medicare Advantage)  01/01/2024        Follow-up and Dispositions    Return in about 3 months (around 7/10/2024) for HTN.            Reviewed plan of care. Patient has provided input and agrees with goals.     The nurse provided the patient and/or family with advanced directive information if needed and encouraged the patient to provide a copy to the office when available.

## 2024-04-11 LAB
25(OH)D3 SERPL-MCNC: 32.3 NG/ML (ref 30–100)
ALBUMIN SERPL-MCNC: 3.5 G/DL (ref 3.5–5)
ALBUMIN/GLOB SERPL: 1 (ref 1.1–2.2)
ALP SERPL-CCNC: 98 U/L (ref 45–117)
ALT SERPL-CCNC: 22 U/L (ref 12–78)
ANION GAP SERPL CALC-SCNC: 4 MMOL/L (ref 5–15)
AST SERPL-CCNC: 18 U/L (ref 15–37)
BILIRUB SERPL-MCNC: 0.9 MG/DL (ref 0.2–1)
BUN SERPL-MCNC: 12 MG/DL (ref 6–20)
BUN/CREAT SERPL: 16 (ref 12–20)
CALCIUM SERPL-MCNC: 9.4 MG/DL (ref 8.5–10.1)
CHLORIDE SERPL-SCNC: 103 MMOL/L (ref 97–108)
CHOLEST SERPL-MCNC: 208 MG/DL
CO2 SERPL-SCNC: 31 MMOL/L (ref 21–32)
CREAT SERPL-MCNC: 0.75 MG/DL (ref 0.55–1.02)
EST. AVERAGE GLUCOSE BLD GHB EST-MCNC: 128 MG/DL
GLOBULIN SER CALC-MCNC: 3.5 G/DL (ref 2–4)
GLUCOSE SERPL-MCNC: 110 MG/DL (ref 65–100)
HBA1C MFR BLD: 6.1 % (ref 4–5.6)
HDLC SERPL-MCNC: 50 MG/DL
HDLC SERPL: 4.2 (ref 0–5)
LDLC SERPL CALC-MCNC: 131.2 MG/DL (ref 0–100)
POTASSIUM SERPL-SCNC: 3.5 MMOL/L (ref 3.5–5.1)
PROT SERPL-MCNC: 7 G/DL (ref 6.4–8.2)
SODIUM SERPL-SCNC: 138 MMOL/L (ref 136–145)
TRIGL SERPL-MCNC: 134 MG/DL
VLDLC SERPL CALC-MCNC: 26.8 MG/DL

## 2024-04-18 ENCOUNTER — TELEPHONE (OUTPATIENT)
Facility: CLINIC | Age: 69
End: 2024-04-18

## 2024-04-18 NOTE — TELEPHONE ENCOUNTER
----- Message from Yudelka Terry MD sent at 4/18/2024 12:43 PM EDT -----  Vitamin D normal.  Normal kidney and liver labs.  A1c higher at 6.1 but still in prediabetes range.The 10-year ASCVD risk score (Robert NEFF, et al., 2019) is: 10.5% This refers to your 10 year risk of heart attack, stroke or death from heart disease. This is in a range where cholesterol lowering medications are recommended.  If you are willing I will prescribe atorvastatin 20mg daily.

## 2024-04-18 NOTE — TELEPHONE ENCOUNTER
Verified two patient identifiers, name and . Pt notified of results. She is willing to start the cholesterol medication. No further questions at this time.  PCP: Yudelka Terry MD     Last appt: 4/10/2024  No future appointments.       Requested Prescriptions     Pending Prescriptions Disp Refills    atorvastatin (LIPITOR) 20 MG tablet 90 tablet 1     Sig: Take 1 tablet by mouth daily

## 2024-04-19 RX ORDER — ATORVASTATIN CALCIUM 20 MG/1
20 TABLET, FILM COATED ORAL DAILY
Qty: 90 TABLET | Refills: 1 | Status: SHIPPED | OUTPATIENT
Start: 2024-04-19

## 2024-09-18 ENCOUNTER — TELEPHONE (OUTPATIENT)
Facility: CLINIC | Age: 69
End: 2024-09-18

## 2024-09-26 ENCOUNTER — TELEPHONE (OUTPATIENT)
Facility: CLINIC | Age: 69
End: 2024-09-26

## 2024-10-07 ENCOUNTER — TRANSCRIBE ORDERS (OUTPATIENT)
Facility: HOSPITAL | Age: 69
End: 2024-10-07

## 2024-10-07 DIAGNOSIS — Z12.31 VISIT FOR SCREENING MAMMOGRAM: Primary | ICD-10-CM

## 2024-10-17 DIAGNOSIS — I10 HYPERTENSION, ESSENTIAL, BENIGN: ICD-10-CM

## 2024-10-17 NOTE — TELEPHONE ENCOUNTER
PCP: Yudelka Terry MD     Last appt: 4/10/2024    Future Appointments   Date Time Provider Department Center   10/28/2024  1:15 PM [facility] 3 [facility]   11/25/2024  9:30 AM Yudelka Terry MD [facility]          Requested Prescriptions     Pending Prescriptions Disp Refills    atorvastatin (LIPITOR) 20 MG tablet [Pharmacy Med Name: ATORVASTATIN 20MG TABLETS] 90 tablet 1     Sig: TAKE 1 TABLET BY MOUTH DAILY    lisinopril (PRINIVIL;ZESTRIL) 20 MG tablet [Pharmacy Med Name: LISINOPRIL 20MG TABLETS] 90 tablet 1     Sig: TAKE 1 TABLET BY MOUTH DAILY

## 2024-10-18 RX ORDER — LISINOPRIL 20 MG/1
20 TABLET ORAL DAILY
Qty: 90 TABLET | Refills: 1 | Status: SHIPPED | OUTPATIENT
Start: 2024-10-18

## 2024-10-18 RX ORDER — ATORVASTATIN CALCIUM 20 MG/1
20 TABLET, FILM COATED ORAL DAILY
Qty: 90 TABLET | Refills: 1 | Status: SHIPPED | OUTPATIENT
Start: 2024-10-18

## 2024-10-28 ENCOUNTER — HOSPITAL ENCOUNTER (OUTPATIENT)
Facility: HOSPITAL | Age: 69
Discharge: HOME | End: 2024-10-31
Attending: INTERNAL MEDICINE
Payer: MEDICARE

## 2024-10-28 VITALS — HEIGHT: 63 IN | WEIGHT: 220 LBS | BODY MASS INDEX: 38.98 KG/M2

## 2024-10-28 DIAGNOSIS — Z12.31 VISIT FOR SCREENING MAMMOGRAM: ICD-10-CM

## 2024-10-28 PROCEDURE — 77063 BREAST TOMOSYNTHESIS BI: CPT

## 2024-11-25 ENCOUNTER — OFFICE VISIT (OUTPATIENT)
Facility: CLINIC | Age: 69
End: 2024-11-25
Payer: MEDICARE

## 2024-11-25 VITALS
SYSTOLIC BLOOD PRESSURE: 146 MMHG | DIASTOLIC BLOOD PRESSURE: 86 MMHG | HEART RATE: 71 BPM | HEIGHT: 63 IN | TEMPERATURE: 98.2 F | BODY MASS INDEX: 40.13 KG/M2 | RESPIRATION RATE: 12 BRPM | WEIGHT: 226.5 LBS | OXYGEN SATURATION: 98 %

## 2024-11-25 DIAGNOSIS — R35.0 URINARY FREQUENCY: ICD-10-CM

## 2024-11-25 DIAGNOSIS — Z12.11 COLON CANCER SCREENING: ICD-10-CM

## 2024-11-25 DIAGNOSIS — I10 HYPERTENSION, ESSENTIAL, BENIGN: Primary | ICD-10-CM

## 2024-11-25 DIAGNOSIS — R39.15 URINARY URGENCY: ICD-10-CM

## 2024-11-25 DIAGNOSIS — L20.84 INTRINSIC ECZEMA: ICD-10-CM

## 2024-11-25 PROCEDURE — G8427 DOCREV CUR MEDS BY ELIG CLIN: HCPCS | Performed by: INTERNAL MEDICINE

## 2024-11-25 PROCEDURE — 3017F COLORECTAL CA SCREEN DOC REV: CPT | Performed by: INTERNAL MEDICINE

## 2024-11-25 PROCEDURE — 1090F PRES/ABSN URINE INCON ASSESS: CPT | Performed by: INTERNAL MEDICINE

## 2024-11-25 PROCEDURE — 3079F DIAST BP 80-89 MM HG: CPT | Performed by: INTERNAL MEDICINE

## 2024-11-25 PROCEDURE — 1123F ACP DISCUSS/DSCN MKR DOCD: CPT | Performed by: INTERNAL MEDICINE

## 2024-11-25 PROCEDURE — G8399 PT W/DXA RESULTS DOCUMENT: HCPCS | Performed by: INTERNAL MEDICINE

## 2024-11-25 PROCEDURE — G8417 CALC BMI ABV UP PARAM F/U: HCPCS | Performed by: INTERNAL MEDICINE

## 2024-11-25 PROCEDURE — 1036F TOBACCO NON-USER: CPT | Performed by: INTERNAL MEDICINE

## 2024-11-25 PROCEDURE — 99214 OFFICE O/P EST MOD 30 MIN: CPT | Performed by: INTERNAL MEDICINE

## 2024-11-25 PROCEDURE — 1159F MED LIST DOCD IN RCRD: CPT | Performed by: INTERNAL MEDICINE

## 2024-11-25 PROCEDURE — 3077F SYST BP >= 140 MM HG: CPT | Performed by: INTERNAL MEDICINE

## 2024-11-25 PROCEDURE — G8484 FLU IMMUNIZE NO ADMIN: HCPCS | Performed by: INTERNAL MEDICINE

## 2024-11-25 RX ORDER — LISINOPRIL AND HYDROCHLOROTHIAZIDE 12.5; 2 MG/1; MG/1
1 TABLET ORAL DAILY
Qty: 90 TABLET | Refills: 1 | Status: SHIPPED | OUTPATIENT
Start: 2024-11-25

## 2024-11-25 RX ORDER — HYDROCHLOROTHIAZIDE 12.5 MG/1
12.5 CAPSULE ORAL EVERY MORNING
Qty: 90 CAPSULE | Refills: 0 | Status: SHIPPED | OUTPATIENT
Start: 2024-11-25

## 2024-11-25 RX ORDER — TRIAMCINOLONE ACETONIDE 1 MG/G
OINTMENT TOPICAL
Qty: 30 G | Refills: 1 | Status: SHIPPED | OUTPATIENT
Start: 2024-11-25 | End: 2024-12-02

## 2024-11-25 RX ORDER — NICOTINE POLACRILEX 4 MG/1
20 GUM, CHEWING ORAL DAILY
COMMUNITY
Start: 2024-10-05

## 2024-11-25 SDOH — ECONOMIC STABILITY: FOOD INSECURITY: WITHIN THE PAST 12 MONTHS, YOU WORRIED THAT YOUR FOOD WOULD RUN OUT BEFORE YOU GOT MONEY TO BUY MORE.: NEVER TRUE

## 2024-11-25 SDOH — ECONOMIC STABILITY: FOOD INSECURITY: WITHIN THE PAST 12 MONTHS, THE FOOD YOU BOUGHT JUST DIDN'T LAST AND YOU DIDN'T HAVE MONEY TO GET MORE.: NEVER TRUE

## 2024-11-25 SDOH — ECONOMIC STABILITY: INCOME INSECURITY: HOW HARD IS IT FOR YOU TO PAY FOR THE VERY BASICS LIKE FOOD, HOUSING, MEDICAL CARE, AND HEATING?: NOT HARD AT ALL

## 2024-11-25 NOTE — PROGRESS NOTES
HPI  Ms. Patricia Junior is a 69 y.o. year old female, she is seen today for follow up HTN.  Plan last visit in April:  Anxiety controlled - continue current medications   Work on exercise, portion control for weight loss  Check A1c  Kegels for urinary frequency and stop hctz - increase lisinopril to 20mg daily  Asthma well controlled     Today:  Was supposed to follow up in July.   BP at home has been 130s/70s  No chest pain, sob, dizziness, weakness, lightheadedness.   Has mild edema in her ankles and hands since stopping hctz  Says urinary frequency isn't much better without hctz    Chief Complaint   Patient presents with    Hypertension    Rash     Both legs        Prior to Admission medications    Medication Sig Start Date End Date Taking? Authorizing Provider   omeprazole 20 MG EC tablet Take 1 tablet by mouth daily 10/5/24  Yes ProviderWan MD   hydroCHLOROthiazide 12.5 MG capsule Take 1 capsule by mouth every morning 11/25/24  Yes Yudelka Terry MD   lisinopril-hydroCHLOROthiazide (PRINZIDE;ZESTORETIC) 20-12.5 MG per tablet Take 1 tablet by mouth daily 11/25/24  Yes Yudelka Terry MD   triamcinolone (KENALOG) 0.1 % ointment Apply topically 2 times daily. 11/25/24 12/2/24 Yes Yudelka Terry MD   atorvastatin (LIPITOR) 20 MG tablet TAKE 1 TABLET BY MOUTH DAILY 10/18/24  Yes Yudelka Terry MD   lisinopril (PRINIVIL;ZESTRIL) 20 MG tablet TAKE 1 TABLET BY MOUTH DAILY 10/18/24  Yes Yudelka Terry MD   omeprazole (PRILOSEC OTC) 20 MG tablet Take 1 tablet by mouth daily 9/21/24  Yes Yudelka Terry MD   vitamin D (ERGOCALCIFEROL) 1.25 MG (09351 UT) CAPS capsule Take 1 capsule by mouth once a week 9/21/24  Yes Yudelka Terry MD   albuterol sulfate HFA (PROVENTIL;VENTOLIN;PROAIR) 108 (90 Base) MCG/ACT inhaler Inhale 1-2 puffs into the lungs every 4 hours as needed 5/2/23  Yes ProviderWan MD   calcium carbonate (OS-MILY) 1250 (500 Ca) MG chewable tablet Take 1 tablet

## 2024-11-25 NOTE — PROGRESS NOTES
Patricia Junior  Identified pt with two pt identifiers(name and ).     Chief Complaint   Patient presents with    Hypertension    Rash     Both legs       Reviewed record In preparation for visit and have obtained necessary documentation.     1. Have you been to the ER, urgent care clinic or hospitalized since your last visit? No     2. Have you seen or consulted any other health care providers outside of the [facility] since your last visit? Include any pap smears or colon screening. No    Vitals reviewed with provider.    Health Maintenance reviewed:     Health Maintenance Due   Topic    Annual Wellness Visit (Medicare Advantage)     Flu vaccine (1)    Colorectal Cancer Screen           Wt Readings from Last 3 Encounters:   24 102.7 kg (226 lb 8 oz)   10/28/24 99.8 kg (220 lb)   04/10/24 103.4 kg (228 lb)        Temp Readings from Last 3 Encounters:   24 98.2 °F (36.8 °C) (Oral)   04/10/24 97.9 °F (36.6 °C) (Oral)   23 98.3 °F (36.8 °C) (Oral)        BP Readings from Last 3 Encounters:   24 (!) 165/80   04/10/24 130/84   23 125/80        Pulse Readings from Last 3 Encounters:   24 71   04/10/24 75   23 71      [unfilled]       Learning Assessment:   :         2023    11:00 AM   [facility] AMB LEARNING ASSESSMENT   Primary Learner Patient   level of education [employer]   Barriers Factors NONE   co-learner caregiver No   Primary Language ENGLISH   Learning Preference DEMONSTRATION   Answered By patient   Relationship to Learner SELF         Fall Risk Assessment:   :         2024     1:09 PM 2023     5:40 PM 2022    11:00 AM   Amb Fall Risk Assessment and TUG Test   Do you feel unsteady or are you worried about falling?  no no    2 or more falls in past year? no no    Fall with injury in past year? no no    Fall in past 12 months?   0   Able to walk?   Yes          Abuse Screening:   :         2024     1:00 PM 2023    10:00 AM

## 2025-04-07 RX ORDER — ERGOCALCIFEROL 1.25 MG/1
50000 CAPSULE, LIQUID FILLED ORAL WEEKLY
Qty: 12 CAPSULE | Refills: 1 | OUTPATIENT
Start: 2025-04-07

## 2025-04-07 NOTE — TELEPHONE ENCOUNTER
PCP: Yudelka Terry MD     Last appt: 11/25/2024    Future Appointments   Date Time Provider Department Center   6/10/2025 10:10 AM Yudelka Terry MD [facility] DEP          Requested Prescriptions     Pending Prescriptions Disp Refills    vitamin D (ERGOCALCIFEROL) 1.25 MG (27953 UT) CAPS capsule 12 capsule 1     Sig: Take 1 capsule by mouth once a week

## 2025-04-10 ENCOUNTER — TELEPHONE (OUTPATIENT)
Facility: CLINIC | Age: 70
End: 2025-04-10

## 2025-05-23 ENCOUNTER — TELEPHONE (OUTPATIENT)
Facility: CLINIC | Age: 70
End: 2025-05-23

## 2025-05-23 RX ORDER — ATORVASTATIN CALCIUM 20 MG/1
20 TABLET, FILM COATED ORAL DAILY
Qty: 90 TABLET | Refills: 1 | Status: SHIPPED | OUTPATIENT
Start: 2025-05-23

## 2025-05-23 NOTE — TELEPHONE ENCOUNTER
PCP: Yudelka Terry MD     Last appt: 11/25/2024    Future Appointments   Date Time Provider Department Center   5/28/2025  9:30 AM Yudelka Terry MD [facility] DEP          Requested Prescriptions     Pending Prescriptions Disp Refills    atorvastatin (LIPITOR) 20 MG tablet 90 tablet 1     Sig: Take 1 tablet by mouth daily

## 2025-05-27 SDOH — HEALTH STABILITY: PHYSICAL HEALTH: ON AVERAGE, HOW MANY DAYS PER WEEK DO YOU ENGAGE IN MODERATE TO STRENUOUS EXERCISE (LIKE A BRISK WALK)?: 2 DAYS

## 2025-05-27 ASSESSMENT — LIFESTYLE VARIABLES
HOW OFTEN DO YOU HAVE A DRINK CONTAINING ALCOHOL: 2
HOW OFTEN DO YOU HAVE SIX OR MORE DRINKS ON ONE OCCASION: 1
HOW MANY STANDARD DRINKS CONTAINING ALCOHOL DO YOU HAVE ON A TYPICAL DAY: 1 OR 2
HOW OFTEN DO YOU HAVE A DRINK CONTAINING ALCOHOL: MONTHLY OR LESS
HOW MANY STANDARD DRINKS CONTAINING ALCOHOL DO YOU HAVE ON A TYPICAL DAY: 1

## 2025-05-27 ASSESSMENT — PATIENT HEALTH QUESTIONNAIRE - PHQ9
1. LITTLE INTEREST OR PLEASURE IN DOING THINGS: NOT AT ALL
SUM OF ALL RESPONSES TO PHQ QUESTIONS 1-9: 0
2. FEELING DOWN, DEPRESSED OR HOPELESS: NOT AT ALL
SUM OF ALL RESPONSES TO PHQ QUESTIONS 1-9: 0

## 2025-05-28 ENCOUNTER — OFFICE VISIT (OUTPATIENT)
Facility: CLINIC | Age: 70
End: 2025-05-28
Payer: MEDICARE

## 2025-05-28 VITALS
HEART RATE: 76 BPM | BODY MASS INDEX: 40.66 KG/M2 | HEIGHT: 63 IN | RESPIRATION RATE: 12 BRPM | TEMPERATURE: 97.6 F | OXYGEN SATURATION: 97 % | WEIGHT: 229.5 LBS | SYSTOLIC BLOOD PRESSURE: 124 MMHG | DIASTOLIC BLOOD PRESSURE: 83 MMHG

## 2025-05-28 DIAGNOSIS — E55.9 VITAMIN D DEFICIENCY: ICD-10-CM

## 2025-05-28 DIAGNOSIS — I10 HYPERTENSION, ESSENTIAL, BENIGN: ICD-10-CM

## 2025-05-28 DIAGNOSIS — F41.1 GENERALIZED ANXIETY DISORDER: ICD-10-CM

## 2025-05-28 DIAGNOSIS — R73.01 IFG (IMPAIRED FASTING GLUCOSE): ICD-10-CM

## 2025-05-28 DIAGNOSIS — Z00.00 INITIAL MEDICARE ANNUAL WELLNESS VISIT: Primary | ICD-10-CM

## 2025-05-28 DIAGNOSIS — E78.2 HYPERLIPIDEMIA, MIXED: ICD-10-CM

## 2025-05-28 DIAGNOSIS — Z12.11 COLON CANCER SCREENING: ICD-10-CM

## 2025-05-28 PROCEDURE — 99214 OFFICE O/P EST MOD 30 MIN: CPT | Performed by: INTERNAL MEDICINE

## 2025-05-28 PROCEDURE — G8417 CALC BMI ABV UP PARAM F/U: HCPCS | Performed by: INTERNAL MEDICINE

## 2025-05-28 PROCEDURE — 1160F RVW MEDS BY RX/DR IN RCRD: CPT | Performed by: INTERNAL MEDICINE

## 2025-05-28 PROCEDURE — G8399 PT W/DXA RESULTS DOCUMENT: HCPCS | Performed by: INTERNAL MEDICINE

## 2025-05-28 PROCEDURE — G0438 PPPS, INITIAL VISIT: HCPCS | Performed by: INTERNAL MEDICINE

## 2025-05-28 PROCEDURE — 1123F ACP DISCUSS/DSCN MKR DOCD: CPT | Performed by: INTERNAL MEDICINE

## 2025-05-28 PROCEDURE — 1036F TOBACCO NON-USER: CPT | Performed by: INTERNAL MEDICINE

## 2025-05-28 PROCEDURE — 3074F SYST BP LT 130 MM HG: CPT | Performed by: INTERNAL MEDICINE

## 2025-05-28 PROCEDURE — 3079F DIAST BP 80-89 MM HG: CPT | Performed by: INTERNAL MEDICINE

## 2025-05-28 PROCEDURE — 3017F COLORECTAL CA SCREEN DOC REV: CPT | Performed by: INTERNAL MEDICINE

## 2025-05-28 PROCEDURE — 1090F PRES/ABSN URINE INCON ASSESS: CPT | Performed by: INTERNAL MEDICINE

## 2025-05-28 PROCEDURE — 1159F MED LIST DOCD IN RCRD: CPT | Performed by: INTERNAL MEDICINE

## 2025-05-28 PROCEDURE — G8427 DOCREV CUR MEDS BY ELIG CLIN: HCPCS | Performed by: INTERNAL MEDICINE

## 2025-05-28 RX ORDER — ONDANSETRON 4 MG/1
TABLET, ORALLY DISINTEGRATING ORAL
COMMUNITY
Start: 2025-04-29

## 2025-05-28 SDOH — ECONOMIC STABILITY: FOOD INSECURITY: WITHIN THE PAST 12 MONTHS, THE FOOD YOU BOUGHT JUST DIDN'T LAST AND YOU DIDN'T HAVE MONEY TO GET MORE.: NEVER TRUE

## 2025-05-28 SDOH — ECONOMIC STABILITY: FOOD INSECURITY: WITHIN THE PAST 12 MONTHS, YOU WORRIED THAT YOUR FOOD WOULD RUN OUT BEFORE YOU GOT MONEY TO BUY MORE.: NEVER TRUE

## 2025-05-28 NOTE — PROGRESS NOTES
Have you been to the ER, urgent care clinic since your last visit?  Hospitalized since your last visit?   YES - When: approximately 1 months ago.  Where and Why: [facility]    Have you seen or consulted any other health care providers outside our system since your last visit?   NO      “Have you had a colorectal cancer screening such as a colonoscopy/FIT/Cologuard?    NO     Date of last Colonoscopy: 11/25/2014  No cologuard on file  No FIT/FOBT on file   No flexible sigmoidoscopy on file

## 2025-05-28 NOTE — PROGRESS NOTES
Medicare Annual Wellness Visit    Patricia Junior is here for Medicare AWV    Assessment & Plan   Initial Medicare annual wellness visit  Hypertension, essential, benign  -     Comprehensive Metabolic Panel; Future  Vitamin D deficiency  -     Vitamin D 25 Hydroxy; Future  Hyperlipidemia, mixed  -     Lipid Panel; Future  Generalized anxiety disorder  IFG (impaired fasting glucose)  -     Hemoglobin A1C; Future  Colon cancer screening  -     AFL - Leatha Hernandez MD, Gastroenterology, [place]  BP at goal, continue current medications.  Check electrolytes.  Check vitamin D, has been low, status post repletion.  Anxiety controlled without medications.  Declines metformin.  Check A1c.  Check lipids, on statin.     Return in about 6 months (around 11/28/2025) for HTN.     Subjective   The following acute and/or chronic problems were also addressed today:  No chest pain, sob, dizziness, weakness, lightheadedness. No n/v/abd pain, melena or brbpr.   Anxiety a little worse at times - certain situations will trigger it to be worse.   Not severe - doesn't want medications.     Urinary frequency and urgency is still bothersome at times. Didn't see urogyn yet - had to cancel and never rescheduled.     Patient's complete Health Risk Assessment and screening values have been reviewed and are found in Flowsheets. The following problems were reviewed today and where indicated follow up appointments were made and/or referrals ordered.    Positive Risk Factor Screenings with Interventions:              Inactivity:  On average, how many days per week do you engage in moderate to strenuous exercise (like a brisk walk)?: (Proxy-Rptd) 2 days (!) Abnormal     Interventions:  See AVS for additional education material     Abnormal BMI (obese):  Body mass index is 40.65 kg/m². (!) Abnormal  Interventions:  See AVS for additional education material          Vision Screen:  Do you have difficulty driving, watching TV, or doing

## 2025-05-28 NOTE — PATIENT INSTRUCTIONS
Learning About Being Active as an Older Adult  Why is being active important as you get older?     Being active is one of the best things you can do for your health. And it's never too late to start. Being active--or getting active, if you aren't already--has definite benefits. It can:  Give you more energy,  Keep your mind sharp.  Improve balance to reduce your risk of falls.  Help you manage chronic illness with fewer medicines.  No matter how old you are, how fit you are, or what health problems you have, there is a form of activity that will work for you. And the more physical activity you can do, the better your overall health will be.  What kinds of activity can help you stay healthy?  Being more active will make your daily activities easier. Physical activity includes planned exercise and things you do in daily life. There are four types of activity:  Aerobic.  Doing aerobic activity makes your heart and lungs strong.  Includes walking, dancing, and gardening.  Aim for at least 2½ hours spread throughout the week.  It improves your energy and can help you sleep better.  Muscle-strengthening.  This type of activity can help maintain muscle and strengthen bones.  Includes climbing stairs, using resistance bands, and lifting or carrying heavy loads.  Aim for at least twice a week.  It can help protect the knees and other joints.  Stretching.  Stretching gives you better range of motion in joints and muscles.  Includes upper arm stretches, calf stretches, and gentle yoga.  Aim for at least twice a week, preferably after your muscles are warmed up from other activities.  It can help you function better in daily life.  Balancing.  This helps you stay coordinated and have good posture.  Includes heel-to-toe walking, pravin chi, and certain types of yoga.  Aim for at least 3 days a week.  It can reduce your risk of falling.  Even if you have a hard time meeting the recommendations, it's better to be more active

## 2025-05-29 LAB
25(OH)D3 SERPL-MCNC: 58.2 NG/ML (ref 30–100)
ALBUMIN SERPL-MCNC: 3.5 G/DL (ref 3.5–5)
ALBUMIN/GLOB SERPL: 1 (ref 1.1–2.2)
ALP SERPL-CCNC: 114 U/L (ref 45–117)
ALT SERPL-CCNC: 21 U/L (ref 12–78)
ANION GAP SERPL CALC-SCNC: 4 MMOL/L (ref 2–12)
AST SERPL-CCNC: 13 U/L (ref 15–37)
BILIRUB SERPL-MCNC: 0.7 MG/DL (ref 0.2–1)
BUN SERPL-MCNC: 13 MG/DL (ref 6–20)
BUN/CREAT SERPL: 17 (ref 12–20)
CALCIUM SERPL-MCNC: 9.5 MG/DL (ref 8.5–10.1)
CHLORIDE SERPL-SCNC: 103 MMOL/L (ref 97–108)
CHOLEST SERPL-MCNC: 129 MG/DL
CO2 SERPL-SCNC: 31 MMOL/L (ref 21–32)
CREAT SERPL-MCNC: 0.76 MG/DL (ref 0.55–1.02)
EST. AVERAGE GLUCOSE BLD GHB EST-MCNC: 134 MG/DL
GLOBULIN SER CALC-MCNC: 3.4 G/DL (ref 2–4)
GLUCOSE SERPL-MCNC: 109 MG/DL (ref 65–100)
HBA1C MFR BLD: 6.3 % (ref 4–5.6)
HDLC SERPL-MCNC: 42 MG/DL
HDLC SERPL: 3.1 (ref 0–5)
LDLC SERPL CALC-MCNC: 65.8 MG/DL (ref 0–100)
POTASSIUM SERPL-SCNC: 3.8 MMOL/L (ref 3.5–5.1)
PROT SERPL-MCNC: 6.9 G/DL (ref 6.4–8.2)
SODIUM SERPL-SCNC: 138 MMOL/L (ref 136–145)
TRIGL SERPL-MCNC: 106 MG/DL
VLDLC SERPL CALC-MCNC: 21.2 MG/DL

## 2025-05-30 ENCOUNTER — RESULTS FOLLOW-UP (OUTPATIENT)
Facility: CLINIC | Age: 70
End: 2025-05-30

## 2025-06-09 DIAGNOSIS — I10 HYPERTENSION, ESSENTIAL, BENIGN: ICD-10-CM

## 2025-06-09 RX ORDER — LISINOPRIL AND HYDROCHLOROTHIAZIDE 12.5; 2 MG/1; MG/1
1 TABLET ORAL DAILY
Qty: 90 TABLET | Refills: 1 | Status: SHIPPED | OUTPATIENT
Start: 2025-06-09

## 2025-06-09 NOTE — TELEPHONE ENCOUNTER
Future Appointments:  Future Appointments   Date Time Provider Department Center   12/3/2025  9:50 AM Yudelka Terry MD [facility] DEP        Last Appointment With Me:  5/28/2025     Requested Prescriptions     Pending Prescriptions Disp Refills    lisinopril-hydroCHLOROthiazide (PRINZIDE;ZESTORETIC) 20-12.5 MG per tablet 90 tablet 1     Sig: Take 1 tablet by mouth daily